# Patient Record
Sex: MALE | Race: WHITE | Employment: FULL TIME | URBAN - METROPOLITAN AREA
[De-identification: names, ages, dates, MRNs, and addresses within clinical notes are randomized per-mention and may not be internally consistent; named-entity substitution may affect disease eponyms.]

---

## 2020-08-08 ENCOUNTER — HOSPITAL ENCOUNTER (EMERGENCY)
Age: 35
Discharge: HOME | End: 2020-08-08
Attending: EMERGENCY MEDICINE
Payer: COMMERCIAL

## 2020-08-08 VITALS
OXYGEN SATURATION: 96 % | RESPIRATION RATE: 18 BRPM | DIASTOLIC BLOOD PRESSURE: 112 MMHG | HEART RATE: 91 BPM | SYSTOLIC BLOOD PRESSURE: 158 MMHG | HEIGHT: 71 IN | WEIGHT: 315 LBS | TEMPERATURE: 98 F | BODY MASS INDEX: 44.1 KG/M2

## 2020-08-08 DIAGNOSIS — I10 ESSENTIAL HYPERTENSION: ICD-10-CM

## 2020-08-08 DIAGNOSIS — M54.16 LUMBAR RADICULOPATHY: Primary | ICD-10-CM

## 2020-08-08 PROCEDURE — 99283 EMERGENCY DEPT VISIT LOW MDM: CPT

## 2020-08-08 RX ORDER — IBUPROFEN 600 MG/1
600 TABLET ORAL ONCE
Status: COMPLETED | OUTPATIENT
Start: 2020-08-08 | End: 2020-08-08

## 2020-08-08 RX ORDER — LISINOPRIL AND HYDROCHLOROTHIAZIDE 20; 12.5 MG/1; MG/1
1 TABLET ORAL DAILY
Qty: 30 TABLET | Refills: 0 | Status: SHIPPED | OUTPATIENT
Start: 2020-08-08 | End: 2020-09-07

## 2020-08-08 RX ORDER — LISINOPRIL 20 MG/1
20 TABLET ORAL DAILY
COMMUNITY

## 2020-08-08 RX ORDER — PREDNISONE 20 MG/1
40 TABLET ORAL DAILY
Qty: 10 TABLET | Refills: 0 | Status: SHIPPED | OUTPATIENT
Start: 2020-08-08 | End: 2020-08-13

## 2020-08-08 RX ORDER — HYDROCHLOROTHIAZIDE 12.5 MG/1
12.5 CAPSULE, GELATIN COATED ORAL DAILY
COMMUNITY

## 2020-08-08 RX ORDER — CLONIDINE HYDROCHLORIDE 0.1 MG/1
0.1 TABLET ORAL ONCE
Status: COMPLETED | OUTPATIENT
Start: 2020-08-08 | End: 2020-08-08

## 2020-08-08 RX ORDER — PREDNISONE 20 MG/1
60 TABLET ORAL ONCE
Status: COMPLETED | OUTPATIENT
Start: 2020-08-08 | End: 2020-08-08

## 2020-08-08 NOTE — ED PROVIDER NOTES
Patient Seen in: THE [facility] Emergency Department In [place]      History   Patient presents with:  Back Pain    Stated Complaint: LOW BACK PAIN    HPI    This is a 30 old male with past medical history of hypertension noncompliant, obesity who presents wi paraspinal muscles. EXTREMITIES: Warm with brisk capillary refill. Neuro: +2/4 bilateral patellar and Achilles reflexes. Sensation equal intact. Motor strength 5/5 in lower extremities.     ED Course   Labs Reviewed - No data to display               M

## 2020-08-08 NOTE — ED INITIAL ASSESSMENT (HPI)
p tto ed from home with c/o low back pain, pt sx present for a few days, denies injury or trauma, pt having some numbness to l leg.

## 2022-12-27 ENCOUNTER — TELEPHONE (OUTPATIENT)
Dept: FAMILY MEDICINE CLINIC | Facility: CLINIC | Age: 37
End: 2022-12-27

## 2022-12-27 ENCOUNTER — OFFICE VISIT (OUTPATIENT)
Dept: FAMILY MEDICINE CLINIC | Facility: CLINIC | Age: 37
End: 2022-12-27
Payer: COMMERCIAL

## 2022-12-27 VITALS
BODY MASS INDEX: 46.13 KG/M2 | WEIGHT: 315 LBS | TEMPERATURE: 98 F | RESPIRATION RATE: 18 BRPM | HEART RATE: 90 BPM | DIASTOLIC BLOOD PRESSURE: 100 MMHG | HEIGHT: 69.25 IN | SYSTOLIC BLOOD PRESSURE: 160 MMHG

## 2022-12-27 DIAGNOSIS — I10 ESSENTIAL HYPERTENSION: ICD-10-CM

## 2022-12-27 DIAGNOSIS — Z00.00 ANNUAL PHYSICAL EXAM: Primary | ICD-10-CM

## 2022-12-27 DIAGNOSIS — Z13.6 SCREENING FOR CARDIOVASCULAR CONDITION: ICD-10-CM

## 2022-12-27 PROCEDURE — 99385 PREV VISIT NEW AGE 18-39: CPT | Performed by: FAMILY MEDICINE

## 2022-12-27 PROCEDURE — 3077F SYST BP >= 140 MM HG: CPT | Performed by: FAMILY MEDICINE

## 2022-12-27 PROCEDURE — 3080F DIAST BP >= 90 MM HG: CPT | Performed by: FAMILY MEDICINE

## 2022-12-27 PROCEDURE — 3008F BODY MASS INDEX DOCD: CPT | Performed by: FAMILY MEDICINE

## 2022-12-27 RX ORDER — LISINOPRIL 20 MG/1
20 TABLET ORAL DAILY
Qty: 90 TABLET | Refills: 0 | Status: SHIPPED | OUTPATIENT
Start: 2022-12-27 | End: 2023-03-27

## 2022-12-27 RX ORDER — LISINOPRIL 20 MG/1
20 TABLET ORAL DAILY
Qty: 90 TABLET | Refills: 0 | Status: SHIPPED | OUTPATIENT
Start: 2022-12-27 | End: 2022-12-27

## 2022-12-27 RX ORDER — OMEPRAZOLE 20 MG/1
20 CAPSULE, DELAYED RELEASE ORAL
COMMUNITY

## 2022-12-27 NOTE — TELEPHONE ENCOUNTER
[facility] pharmacy called said ot is requesting this medication lisinopril 20 MG Oral Tab   To be sent to them   [facility] [address] [address]  [phone], [phone]

## 2022-12-28 ENCOUNTER — TELEPHONE (OUTPATIENT)
Dept: FAMILY MEDICINE CLINIC | Facility: CLINIC | Age: 37
End: 2022-12-28

## 2022-12-28 DIAGNOSIS — R73.9 HYPERGLYCEMIA: Primary | ICD-10-CM

## 2022-12-28 NOTE — TELEPHONE ENCOUNTER
Can you please call [facility] and add hba1c Stop Victoza.  Stop Lisinopril-Hydrochlorothiazide.  Rest until your dizziness passes. This may take a couple of days.  Don't check blood sugars until Saturday.  Then, call if all your blood sugars are more than 200 during the day.   See me back in a week, if Dr. Silva is not available.

## 2022-12-28 NOTE — TELEPHONE ENCOUNTER
hba1c still pending. Otherwise thyroid blood count normal.  His cholesterol and lft elevated he needs to lose weight low fat diet and exercise and we will repeat lft in 3months.  Blood sugar elevated

## 2022-12-29 LAB
ALBUMIN/GLOBULIN RATIO: 2 (CALC) (ref 1–2.5)
ALBUMIN: 4.3 G/DL (ref 3.6–5.1)
ALKALINE PHOSPHATASE: 67 U/L (ref 36–130)
ALT: 106 U/L (ref 9–46)
AST: 57 U/L (ref 10–40)
BILIRUBIN, TOTAL: 0.9 MG/DL (ref 0.2–1.2)
BUN: 16 MG/DL (ref 7–25)
CALCIUM: 9.2 MG/DL (ref 8.6–10.3)
CARBON DIOXIDE: 25 MMOL/L (ref 20–32)
CHLORIDE: 100 MMOL/L (ref 98–110)
CHOL/HDLC RATIO: 5.5 (CALC)
CHOLESTEROL, TOTAL: 203 MG/DL
CREATININE: 0.84 MG/DL (ref 0.6–1.26)
EGFR: 116 ML/MIN/1.73M2
GLOBULIN: 2.1 G/DL (CALC) (ref 1.9–3.7)
GLUCOSE: 346 MG/DL (ref 65–99)
HDL CHOLESTEROL: 37 MG/DL
HEMATOCRIT: 47.3 % (ref 38.5–50)
HEMOGLOBIN A1C: 11.4 % OF TOTAL HGB
HEMOGLOBIN: 15.8 G/DL (ref 13.2–17.1)
LDL-CHOLESTEROL: 129 MG/DL (CALC)
MCH: 30.9 PG (ref 27–33)
MCHC: 33.4 G/DL (ref 32–36)
MCV: 92.4 FL (ref 80–100)
MPV: 13.3 FL (ref 7.5–12.5)
NON-HDL CHOLESTEROL: 166 MG/DL (CALC)
PLATELET COUNT: 161 THOUSAND/UL (ref 140–400)
POTASSIUM: 4.1 MMOL/L (ref 3.5–5.3)
PROTEIN, TOTAL: 6.4 G/DL (ref 6.1–8.1)
RDW: 11.9 % (ref 11–15)
RED BLOOD CELL COUNT: 5.12 MILLION/UL (ref 4.2–5.8)
SODIUM: 133 MMOL/L (ref 135–146)
TRIGLYCERIDES: 232 MG/DL
TSH W/REFLEX TO FT4: 1.16 MIU/L (ref 0.4–4.5)
WHITE BLOOD CELL COUNT: 5.8 THOUSAND/UL (ref 3.8–10.8)

## 2022-12-29 NOTE — TELEPHONE ENCOUNTER
Patient advised. Verbalized understanding.    Future Appointments   Date Time Provider Aby Deonna   1/7/2023 11:00 AM Luis Fernando Bunn MD [facility]   1/26/2023  8:00 AM Luis Fernando Bunn MD [facility]

## 2023-01-07 ENCOUNTER — OFFICE VISIT (OUTPATIENT)
Dept: FAMILY MEDICINE CLINIC | Facility: CLINIC | Age: 38
End: 2023-01-07
Payer: COMMERCIAL

## 2023-01-07 VITALS
RESPIRATION RATE: 18 BRPM | SYSTOLIC BLOOD PRESSURE: 160 MMHG | TEMPERATURE: 97 F | HEIGHT: 69 IN | DIASTOLIC BLOOD PRESSURE: 120 MMHG | BODY MASS INDEX: 46.51 KG/M2 | HEART RATE: 71 BPM | WEIGHT: 314 LBS | OXYGEN SATURATION: 98 %

## 2023-01-07 DIAGNOSIS — E11.9 TYPE 2 DIABETES MELLITUS WITHOUT COMPLICATION, WITHOUT LONG-TERM CURRENT USE OF INSULIN (HCC): ICD-10-CM

## 2023-01-07 DIAGNOSIS — I10 ESSENTIAL HYPERTENSION: Primary | ICD-10-CM

## 2023-01-07 PROCEDURE — 3008F BODY MASS INDEX DOCD: CPT | Performed by: FAMILY MEDICINE

## 2023-01-07 PROCEDURE — 3080F DIAST BP >= 90 MM HG: CPT | Performed by: FAMILY MEDICINE

## 2023-01-07 PROCEDURE — 3077F SYST BP >= 140 MM HG: CPT | Performed by: FAMILY MEDICINE

## 2023-01-07 PROCEDURE — 99214 OFFICE O/P EST MOD 30 MIN: CPT | Performed by: FAMILY MEDICINE

## 2023-01-07 RX ORDER — HYDROCHLOROTHIAZIDE 25 MG/1
25 TABLET ORAL DAILY
Qty: 90 TABLET | Refills: 0 | Status: SHIPPED | OUTPATIENT
Start: 2023-01-07 | End: 2023-04-07

## 2023-01-07 RX ORDER — LANCETS 28 GAUGE
1 EACH MISCELLANEOUS 3 TIMES DAILY
Qty: 100 EACH | Refills: 1 | Status: SHIPPED | OUTPATIENT
Start: 2023-01-07 | End: 2023-01-07

## 2023-01-07 RX ORDER — LANCETS
EACH MISCELLANEOUS
Qty: 300 EACH | Refills: 0 | Status: SHIPPED | OUTPATIENT
Start: 2023-01-07

## 2023-01-07 RX ORDER — BLOOD-GLUCOSE METER
KIT MISCELLANEOUS
Qty: 100 STRIP | Refills: 1 | Status: SHIPPED | OUTPATIENT
Start: 2023-01-07 | End: 2023-01-07

## 2023-01-07 RX ORDER — METFORMIN HYDROCHLORIDE 750 MG/1
750 TABLET, EXTENDED RELEASE ORAL 2 TIMES DAILY WITH MEALS
Qty: 180 TABLET | Refills: 0 | Status: SHIPPED | OUTPATIENT
Start: 2023-01-07 | End: 2023-04-07

## 2023-01-07 RX ORDER — BLOOD-GLUCOSE METER
1 KIT MISCELLANEOUS AS NEEDED
Qty: 1 EACH | Refills: 0 | Status: SHIPPED | OUTPATIENT
Start: 2023-01-07 | End: 2023-01-07

## 2023-01-07 RX ORDER — BLOOD-GLUCOSE METER
1 EACH MISCELLANEOUS DAILY
Qty: 1 KIT | Refills: 0 | Status: SHIPPED | OUTPATIENT
Start: 2023-01-07

## 2023-01-07 RX ORDER — BLOOD SUGAR DIAGNOSTIC
1 STRIP MISCELLANEOUS 3 TIMES DAILY
Qty: 300 STRIP | Refills: 0 | Status: SHIPPED | OUTPATIENT
Start: 2023-01-07

## 2023-02-06 ENCOUNTER — TELEPHONE (OUTPATIENT)
Dept: FAMILY MEDICINE CLINIC | Facility: CLINIC | Age: 38
End: 2023-02-06

## 2023-02-21 DIAGNOSIS — I10 ESSENTIAL HYPERTENSION: ICD-10-CM

## 2023-02-21 RX ORDER — LISINOPRIL 20 MG/1
20 TABLET ORAL DAILY
Qty: 90 TABLET | Refills: 0 | Status: SHIPPED | OUTPATIENT
Start: 2023-02-21 | End: 2023-05-22

## 2023-02-21 NOTE — TELEPHONE ENCOUNTER
Last refilled on 12/27/22 for # 90 with 0 refills  Last OV 1/7/23  No future appointments. Thank you.

## 2023-02-23 NOTE — TELEPHONE ENCOUNTER
received fax from [facility], patient does not have an account with them - medication sent to this mail order pharmacy per patient request  Called patient to let him know he should set up a profile with [facility]

## 2023-03-02 DIAGNOSIS — E11.9 TYPE 2 DIABETES MELLITUS WITHOUT COMPLICATION, WITHOUT LONG-TERM CURRENT USE OF INSULIN (HCC): ICD-10-CM

## 2023-03-02 DIAGNOSIS — I10 ESSENTIAL HYPERTENSION: ICD-10-CM

## 2023-03-02 RX ORDER — HYDROCHLOROTHIAZIDE 25 MG/1
25 TABLET ORAL DAILY
Qty: 30 TABLET | Refills: 2 | Status: SHIPPED | OUTPATIENT
Start: 2023-03-02 | End: 2023-05-31

## 2023-03-02 RX ORDER — METFORMIN HYDROCHLORIDE 750 MG/1
TABLET, EXTENDED RELEASE ORAL
Qty: 60 TABLET | Refills: 2 | Status: SHIPPED | OUTPATIENT
Start: 2023-03-02

## 2023-03-06 ENCOUNTER — TELEPHONE (OUTPATIENT)
Dept: FAMILY MEDICINE CLINIC | Facility: CLINIC | Age: 38
End: 2023-03-06

## 2023-03-09 ENCOUNTER — TELEPHONE (OUTPATIENT)
Dept: FAMILY MEDICINE CLINIC | Facility: CLINIC | Age: 38
End: 2023-03-09

## 2023-03-09 DIAGNOSIS — I10 ESSENTIAL HYPERTENSION: ICD-10-CM

## 2023-03-09 DIAGNOSIS — E11.9 TYPE 2 DIABETES MELLITUS WITHOUT COMPLICATION, WITHOUT LONG-TERM CURRENT USE OF INSULIN (HCC): ICD-10-CM

## 2023-03-09 RX ORDER — METFORMIN HYDROCHLORIDE 750 MG/1
750 TABLET, EXTENDED RELEASE ORAL 2 TIMES DAILY WITH MEALS
Qty: 180 TABLET | Refills: 0 | Status: SHIPPED | OUTPATIENT
Start: 2023-03-09

## 2023-03-09 RX ORDER — HYDROCHLOROTHIAZIDE 25 MG/1
25 TABLET ORAL DAILY
Qty: 90 TABLET | Refills: 0 | Status: SHIPPED | OUTPATIENT
Start: 2023-03-09 | End: 2023-06-07

## 2023-03-09 RX ORDER — LISINOPRIL 20 MG/1
20 TABLET ORAL DAILY
Qty: 90 TABLET | Refills: 0 | Status: SHIPPED | OUTPATIENT
Start: 2023-03-09 | End: 2023-06-07

## 2023-03-09 NOTE — TELEPHONE ENCOUNTER
RX were sent on 3/2 to pt's local pharmacy,  Pt insurance requires all rx be sent to [facility]    Resend RX:     METFORMIN  MG Oral Tablet 24 Hr  HYDROCHLOROTHIAZIDE 25 MG Oral Tab  lisinopril 20 MG Oral Tab

## 2023-03-10 PROCEDURE — 3051F HG A1C>EQUAL 7.0%<8.0%: CPT | Performed by: FAMILY MEDICINE

## 2023-03-11 LAB
ALBUMIN/GLOBULIN RATIO: 1.7 (CALC) (ref 1–2.5)
ALBUMIN: 4.5 G/DL (ref 3.6–5.1)
ALKALINE PHOSPHATASE: 57 U/L (ref 36–130)
ALT: 76 U/L (ref 9–46)
AST: 69 U/L (ref 10–40)
BILIRUBIN, TOTAL: 1.1 MG/DL (ref 0.2–1.2)
BUN: 17 MG/DL (ref 7–25)
CALCIUM: 9.8 MG/DL (ref 8.6–10.3)
CARBON DIOXIDE: 30 MMOL/L (ref 20–32)
CHLORIDE: 100 MMOL/L (ref 98–110)
CREATININE: 0.84 MG/DL (ref 0.6–1.26)
EGFR: 115 ML/MIN/1.73M2
GLOBULIN: 2.7 G/DL (CALC) (ref 1.9–3.7)
GLUCOSE: 112 MG/DL (ref 65–99)
HEMOGLOBIN A1C: 7 % OF TOTAL HGB
POTASSIUM: 4.4 MMOL/L (ref 3.5–5.3)
PROTEIN, TOTAL: 7.2 G/DL (ref 6.1–8.1)
SODIUM: 138 MMOL/L (ref 135–146)

## 2023-03-19 DIAGNOSIS — I10 ESSENTIAL HYPERTENSION: ICD-10-CM

## 2023-03-19 DIAGNOSIS — E11.9 TYPE 2 DIABETES MELLITUS WITHOUT COMPLICATION, WITHOUT LONG-TERM CURRENT USE OF INSULIN (HCC): ICD-10-CM

## 2023-03-20 RX ORDER — METFORMIN HYDROCHLORIDE 750 MG/1
TABLET, EXTENDED RELEASE ORAL
Qty: 180 TABLET | Refills: 0 | OUTPATIENT
Start: 2023-03-20

## 2023-03-20 RX ORDER — HYDROCHLOROTHIAZIDE 25 MG/1
TABLET ORAL
Qty: 90 TABLET | Refills: 0 | OUTPATIENT
Start: 2023-03-20

## 2023-03-20 RX ORDER — LISINOPRIL 20 MG/1
TABLET ORAL
Qty: 90 TABLET | Refills: 0 | OUTPATIENT
Start: 2023-03-20

## 2023-04-04 RX ORDER — BLOOD SUGAR DIAGNOSTIC
STRIP MISCELLANEOUS
Qty: 100 STRIP | Refills: 2 | Status: SHIPPED | OUTPATIENT
Start: 2023-04-04

## 2023-05-19 DIAGNOSIS — I10 ESSENTIAL HYPERTENSION: ICD-10-CM

## 2023-05-19 DIAGNOSIS — E11.9 TYPE 2 DIABETES MELLITUS WITHOUT COMPLICATION, WITHOUT LONG-TERM CURRENT USE OF INSULIN (HCC): ICD-10-CM

## 2023-05-19 RX ORDER — LISINOPRIL 20 MG/1
TABLET ORAL
Qty: 90 TABLET | Refills: 0 | Status: SHIPPED | OUTPATIENT
Start: 2023-05-19

## 2023-05-19 RX ORDER — METFORMIN HYDROCHLORIDE 750 MG/1
TABLET, EXTENDED RELEASE ORAL
Qty: 180 TABLET | Refills: 0 | Status: SHIPPED | OUTPATIENT
Start: 2023-05-19

## 2023-05-19 RX ORDER — HYDROCHLOROTHIAZIDE 25 MG/1
TABLET ORAL
Qty: 90 TABLET | Refills: 0 | Status: SHIPPED | OUTPATIENT
Start: 2023-05-19

## 2023-07-07 ENCOUNTER — TELEPHONE (OUTPATIENT)
Dept: FAMILY MEDICINE CLINIC | Facility: CLINIC | Age: 38
End: 2023-07-07

## 2023-07-07 DIAGNOSIS — E11.9 TYPE 2 DIABETES MELLITUS WITHOUT COMPLICATION, WITHOUT LONG-TERM CURRENT USE OF INSULIN (HCC): ICD-10-CM

## 2023-07-10 RX ORDER — METFORMIN HYDROCHLORIDE 750 MG/1
750 TABLET, EXTENDED RELEASE ORAL 2 TIMES DAILY WITH MEALS
Qty: 180 TABLET | Refills: 0 | Status: SHIPPED | OUTPATIENT
Start: 2023-07-10

## 2023-08-01 DIAGNOSIS — I10 ESSENTIAL HYPERTENSION: ICD-10-CM

## 2023-08-02 NOTE — TELEPHONE ENCOUNTER
Hypertension Medications Protocol [id number]/01/2023 09:04 PM   Protocol Details Appointment in past 6 or next 3 months    CMP or BMP in past 12 months    Last serum creatinine< 2.0   Due for px  No future appointments.    [facility] sent

## 2023-08-09 RX ORDER — HYDROCHLOROTHIAZIDE 25 MG/1
TABLET ORAL
Qty: 90 TABLET | Refills: 3 | OUTPATIENT
Start: 2023-08-09

## 2023-08-09 RX ORDER — LISINOPRIL 20 MG/1
TABLET ORAL
Qty: 90 TABLET | Refills: 3 | OUTPATIENT
Start: 2023-08-09

## 2023-09-02 ENCOUNTER — TELEPHONE (OUTPATIENT)
Dept: FAMILY MEDICINE CLINIC | Facility: CLINIC | Age: 38
End: 2023-09-02

## 2023-09-02 DIAGNOSIS — I10 ESSENTIAL HYPERTENSION: ICD-10-CM

## 2023-09-05 RX ORDER — LISINOPRIL 20 MG/1
20 TABLET ORAL DAILY
Qty: 90 TABLET | Refills: 0 | Status: SHIPPED | OUTPATIENT
Start: 2023-09-05

## 2023-09-05 NOTE — TELEPHONE ENCOUNTER
Pt called back did schedule apt said can't come in till October.  Per pt can he get a refill on his LISINOPRIL 20 MG Oral.     [facility] DELIVERY ([address] [place] [address]) Noman Vera, [address] [place] [address]     Future Appointments   Date Time Provider Aby Ying   10/5/2023  2:00 PM Jesse Hayes MD [facility]

## 2023-09-28 ENCOUNTER — TELEPHONE (OUTPATIENT)
Dept: FAMILY MEDICINE CLINIC | Facility: CLINIC | Age: 38
End: 2023-09-28

## 2023-09-28 NOTE — TELEPHONE ENCOUNTER
Pt wife called, states pt is in [place] camping and he was bit by something on his shin,  states area he was bit is very sore and feels like its moving to back of his ankle. Wanted to know if he should wait until he comes home to be seen. Informed wife to go to IC and can f/u with Dr or APN. She VU and will let pt know.

## 2023-10-25 DIAGNOSIS — E11.9 TYPE 2 DIABETES MELLITUS WITHOUT COMPLICATION, WITHOUT LONG-TERM CURRENT USE OF INSULIN (HCC): ICD-10-CM

## 2023-10-26 NOTE — TELEPHONE ENCOUNTER
Diabetic Medication Protocol [id number]/25/2023 10:12 PM   Protocol Details HgBA1C procedure resulted in past 6 months    Last HgBA1C < 7.5    Microalbumin procedure in past 12 months or taking ACE/ARB    Appointment in past 6 or next 3 months   Repeat A1c due  Future Appointments   Date Time Provider Aby Ying   11/14/2023 10:00 AM Morris Perry MD [facility]

## 2023-10-31 DIAGNOSIS — I10 ESSENTIAL HYPERTENSION: ICD-10-CM

## 2023-10-31 RX ORDER — LISINOPRIL 20 MG/1
20 TABLET ORAL DAILY
Qty: 90 TABLET | Refills: 3 | Status: SHIPPED | OUTPATIENT
Start: 2023-10-31

## 2023-11-02 RX ORDER — METFORMIN HYDROCHLORIDE 750 MG/1
750 TABLET, EXTENDED RELEASE ORAL 2 TIMES DAILY WITH MEALS
Qty: 180 TABLET | Refills: 3 | OUTPATIENT
Start: 2023-11-02

## 2023-11-14 ENCOUNTER — OFFICE VISIT (OUTPATIENT)
Dept: FAMILY MEDICINE CLINIC | Facility: CLINIC | Age: 38
End: 2023-11-14
Payer: COMMERCIAL

## 2023-11-14 VITALS
HEIGHT: 69 IN | RESPIRATION RATE: 18 BRPM | WEIGHT: 315 LBS | TEMPERATURE: 98 F | HEART RATE: 95 BPM | DIASTOLIC BLOOD PRESSURE: 100 MMHG | SYSTOLIC BLOOD PRESSURE: 150 MMHG | BODY MASS INDEX: 46.65 KG/M2

## 2023-11-14 DIAGNOSIS — Z80.0 FAMILY HISTORY OF COLON CANCER: ICD-10-CM

## 2023-11-14 DIAGNOSIS — I10 ESSENTIAL HYPERTENSION: ICD-10-CM

## 2023-11-14 DIAGNOSIS — Z12.11 COLON CANCER SCREENING: ICD-10-CM

## 2023-11-14 DIAGNOSIS — E11.9 TYPE 2 DIABETES MELLITUS WITHOUT COMPLICATION, WITHOUT LONG-TERM CURRENT USE OF INSULIN (HCC): Primary | ICD-10-CM

## 2023-11-14 PROCEDURE — 3008F BODY MASS INDEX DOCD: CPT | Performed by: FAMILY MEDICINE

## 2023-11-14 PROCEDURE — 3077F SYST BP >= 140 MM HG: CPT | Performed by: FAMILY MEDICINE

## 2023-11-14 PROCEDURE — 99214 OFFICE O/P EST MOD 30 MIN: CPT | Performed by: FAMILY MEDICINE

## 2023-11-14 PROCEDURE — 3080F DIAST BP >= 90 MM HG: CPT | Performed by: FAMILY MEDICINE

## 2023-11-14 RX ORDER — AMLODIPINE BESYLATE 2.5 MG/1
2.5 TABLET ORAL 2 TIMES DAILY
COMMUNITY
Start: 2023-10-30

## 2023-11-14 RX ORDER — LISINOPRIL 20 MG/1
20 TABLET ORAL 2 TIMES DAILY
Qty: 60 TABLET | Refills: 0 | Status: SHIPPED | OUTPATIENT
Start: 2023-11-14

## 2023-11-14 RX ORDER — HYDROCHLOROTHIAZIDE 25 MG/1
25 TABLET ORAL DAILY
Qty: 90 TABLET | Refills: 3 | Status: SHIPPED | OUTPATIENT
Start: 2023-11-14

## 2023-11-14 RX ORDER — HYDROCHLOROTHIAZIDE 25 MG/1
25 TABLET ORAL DAILY
Qty: 30 TABLET | Refills: 0 | Status: SHIPPED | OUTPATIENT
Start: 2023-11-14

## 2023-11-25 ENCOUNTER — NURSE ONLY (OUTPATIENT)
Dept: FAMILY MEDICINE CLINIC | Facility: CLINIC | Age: 38
End: 2023-11-25
Payer: COMMERCIAL

## 2023-11-25 ENCOUNTER — TELEPHONE (OUTPATIENT)
Dept: FAMILY MEDICINE CLINIC | Facility: CLINIC | Age: 38
End: 2023-11-25

## 2023-11-25 VITALS — SYSTOLIC BLOOD PRESSURE: 126 MMHG | DIASTOLIC BLOOD PRESSURE: 82 MMHG

## 2023-11-25 NOTE — TELEPHONE ENCOUNTER
Patient was here for a nurse visit for blood pressure check . Patient sit down for 5 mins before checking his blood pressure. Patients blood pressure was 126/82 when he was here in the office. Please Advise.

## 2023-12-14 ENCOUNTER — TELEPHONE (OUTPATIENT)
Dept: FAMILY MEDICINE CLINIC | Facility: CLINIC | Age: 38
End: 2023-12-14

## 2023-12-14 NOTE — TELEPHONE ENCOUNTER
Overdue labs  [facility] sent  Future Appointments   Date Time Provider Aby Ying   2/12/2024  7:30 AM CURTIS, PROCEDURE [facility] None

## 2024-01-19 ENCOUNTER — TELEPHONE (OUTPATIENT)
Dept: FAMILY MEDICINE CLINIC | Facility: CLINIC | Age: 39
End: 2024-01-19

## 2024-01-19 NOTE — TELEPHONE ENCOUNTER
Overdue labs  [facility] sent  Future Appointments   Date Time Provider Department Center   2/12/2024  7:30 AM CURTIS, PROCEDURE [facility] None

## 2024-02-12 ENCOUNTER — HOSPITAL ENCOUNTER (OUTPATIENT)
Facility: HOSPITAL | Age: 39
Setting detail: HOSPITAL OUTPATIENT SURGERY
Discharge: HOME | End: 2024-02-12
Attending: STUDENT IN AN ORGANIZED HEALTH CARE EDUCATION/TRAINING PROGRAM | Admitting: STUDENT IN AN ORGANIZED HEALTH CARE EDUCATION/TRAINING PROGRAM
Payer: COMMERCIAL

## 2024-02-12 ENCOUNTER — ANESTHESIA (OUTPATIENT)
Dept: ENDOSCOPY | Facility: HOSPITAL | Age: 39
End: 2024-02-12
Payer: COMMERCIAL

## 2024-02-12 ENCOUNTER — ANESTHESIA EVENT (OUTPATIENT)
Dept: ENDOSCOPY | Facility: HOSPITAL | Age: 39
End: 2024-02-12
Payer: COMMERCIAL

## 2024-02-12 VITALS
HEART RATE: 72 BPM | TEMPERATURE: 99 F | DIASTOLIC BLOOD PRESSURE: 77 MMHG | WEIGHT: 315 LBS | BODY MASS INDEX: 46.65 KG/M2 | RESPIRATION RATE: 16 BRPM | HEIGHT: 69 IN | OXYGEN SATURATION: 96 % | SYSTOLIC BLOOD PRESSURE: 92 MMHG

## 2024-02-12 DIAGNOSIS — Z12.11 COLON CANCER SCREENING: ICD-10-CM

## 2024-02-12 LAB
GLUCOSE BLD-MCNC: 226 MG/DL (ref 70–99)
GLUCOSE BLD-MCNC: 258 MG/DL (ref 70–99)

## 2024-02-12 PROCEDURE — 88305 TISSUE EXAM BY PATHOLOGIST: CPT | Performed by: STUDENT IN AN ORGANIZED HEALTH CARE EDUCATION/TRAINING PROGRAM

## 2024-02-12 PROCEDURE — 82962 GLUCOSE BLOOD TEST: CPT

## 2024-02-12 PROCEDURE — 0DBK8ZX EXCISION OF ASCENDING COLON, VIA NATURAL OR ARTIFICIAL OPENING ENDOSCOPIC, DIAGNOSTIC: ICD-10-PCS | Performed by: STUDENT IN AN ORGANIZED HEALTH CARE EDUCATION/TRAINING PROGRAM

## 2024-02-12 RX ORDER — NICOTINE POLACRILEX 4 MG
30 LOZENGE BUCCAL
Status: DISCONTINUED | OUTPATIENT
Start: 2024-02-12 | End: 2024-02-12

## 2024-02-12 RX ORDER — NICOTINE POLACRILEX 4 MG
15 LOZENGE BUCCAL
Status: DISCONTINUED | OUTPATIENT
Start: 2024-02-12 | End: 2024-02-12

## 2024-02-12 RX ORDER — ONDANSETRON 2 MG/ML
4 INJECTION INTRAMUSCULAR; INTRAVENOUS ONCE AS NEEDED
Status: DISCONTINUED | OUTPATIENT
Start: 2024-02-12 | End: 2024-02-12

## 2024-02-12 RX ORDER — SODIUM CHLORIDE, SODIUM LACTATE, POTASSIUM CHLORIDE, CALCIUM CHLORIDE 600; 310; 30; 20 MG/100ML; MG/100ML; MG/100ML; MG/100ML
INJECTION, SOLUTION INTRAVENOUS CONTINUOUS
Status: DISCONTINUED | OUTPATIENT
Start: 2024-02-12 | End: 2024-02-12

## 2024-02-12 RX ORDER — DEXTROSE MONOHYDRATE 25 G/50ML
50 INJECTION, SOLUTION INTRAVENOUS
Status: DISCONTINUED | OUTPATIENT
Start: 2024-02-12 | End: 2024-02-12

## 2024-02-12 RX ORDER — NALOXONE HYDROCHLORIDE 0.4 MG/ML
0.08 INJECTION, SOLUTION INTRAMUSCULAR; INTRAVENOUS; SUBCUTANEOUS ONCE AS NEEDED
Status: DISCONTINUED | OUTPATIENT
Start: 2024-02-12 | End: 2024-02-12

## 2024-02-12 RX ADMIN — SODIUM CHLORIDE, SODIUM LACTATE, POTASSIUM CHLORIDE, CALCIUM CHLORIDE: 600; 310; 30; 20 INJECTION, SOLUTION INTRAVENOUS at 09:16:00

## 2024-02-12 NOTE — H&P
[facility] History and Physical Procedure Note    CC: colonoscopy    History of Present Illness: Jak Esquivel is a 39 year old male who presents for a  colonoscopy.    Indication:   Colon cancer screening   + First Degree Relative with a history of Colorectal Cancer - sister dx with colon cancer in 30s        No overt Blood in stool   No Constipation   No Diarrhea   No Abdominal pain     No Reflux symptoms   No Dysphagia       Medications reviewed as below:   No non-aspirin antithrombotic agents    Medications:  No current outpatient medications on file.    Past Medical History:  Past Medical History:   Diagnosis Date    Diabetes (HCC)     High blood pressure     HTN (hypertension)     LVH (left ventricular hypertrophy)     Sleep apnea     no device yet at time of screen 1/24    Visual impairment     glasses       Past Surgical History:  Past Surgical History:   Procedure Laterality Date    APPENDECTOMY  2000       Family History:  History reviewed. No pertinent family history.    Social History:  Social History     Socioeconomic History    Marital status: Single   Tobacco Use    Smoking status: Never    Smokeless tobacco: Never   Vaping Use    Vaping Use: Never used   Substance and Sexual Activity    Alcohol use: Yes     Comment: social    Drug use: Never       Review of Systems  Negative unless otherwise mentioned in HPI.     Allergies:  No Known Allergies      Objective:    Physical Exam  /89 (BP Location: Left arm)   Pulse 88   Temp 98.9 °F (37.2 °C)   Resp 16   Ht 5' 9\" (1.753 m)   Wt (!) 339 lb (153.8 kg)   SpO2 98%   BMI 50.06 kg/m²   Body mass index is 50.06 kg/m².    Gen: Awake and alert, NAD  CV: Ext warm b/l  Resp: no resp distress  Neuro: NAD, Aox3.     Pertinent labs:   Lab Results   Component Value Date     03/10/2023    K 4.4 03/10/2023     03/10/2023    CO2 30 03/10/2023    BUN 17 03/10/2023     Lab Results   Component Value Date    WBC 5.8 12/27/2022     HGB 15.8 12/27/2022    HCT 47.3 12/27/2022    MCV 92.4 12/27/2022     12/27/2022     Lab Results   Component Value Date    AST 69 (H) 03/10/2023    ALT 76 (H) 03/10/2023    ALB 4.5 03/10/2023     No results found for: \"INR\"    Imaging:  No image results found.       Informed consent  Informed Consent:   The planned procedure(s), the explanation of the procedure, indications, its expected benefits, the potential complications and risks and possible alternatives and their benefits and risks were discussed with the patient. The discussion of risks, not limited to but including bleeding, infection, perforation / tear in the gastrointestinal tract, adverse effects from anesthesia, and possible prolonged hospitalization, emergency surgery, risk of morbidity or mortality, and risk of missed lesion(s) were discussed with patient. Pt understands the missed rate of colonoscopy of polyps, lesions of 5-10% even in the best of circumstances and that although this is an accurate test, there are limitations to colonoscopy.     Patient understood the proposed procedure(s), and informed consented to the procedure and elected to proceed with the procedure(s) with possible intervention (such as polypectomy, biopsy, control of bleeding, etc.) and its risks, benefits and alternatives and wish to proceed with procedure(s). All questions answered in full to patient's  satisfaction in full.       Impression/Recommendations:  Jak Esquivel is a 39 year old male who presents for a colonoscopy +/- endotherapy.   Plan to proceed with colonoscopy with anesthesia.     ASA class per anesthesia.   Informed consent obtained as detailed above.       MICHELLE ACEVEDO M.D.  [facility]

## 2024-02-12 NOTE — OPERATIVE REPORT
Colonoscopy Operative Report  Patient Name: Jak Esquivel  YOB: 1985  MRN: [id number]  Procedure: Colonoscopy with polypectomy  Pre-operative Diagnosis & Indication:   Colon cancer screening (high risk - patient's sister diagnosed with colon cancer)  Post-operative Diagnosis:   Colon polyp x 1 s/p polypectomy  Hemorrhoids   Attending Endoscopist: Ilya Villegas M.D.  Informed Consent: The planned procedure(s), the explanation of the procedure, its expected benefits, the potential complications and risks and possible alternatives and their benefits and risks were discussed with the patient or the patient's surrogate. The discussion of risks, not limited to but including bleeding, infection, perforation, adverse effects from anesthesia, need for emergency surgery, medication effects, cardiac arrhythmias, missed polyps, and aspiration and death, were discussed with patient.  Pt and/or surrogate understood the proposed procedure(s), its risks, benefits and alternatives and wish to proceed with procedure(s). All questions answered in full.  After all questions were answered to their satisfaction, a signed, informed, and witnessed consent was obtained.  Physical Exam: Heart: regular rate and rhythm. No rubs, murmurs, or gallops. Lungs: Clear to auscultation bilaterally. Abdomen: Soft, non-tender, non distended. Positive bowel sounds. No rebound tenderness, no guarding.   A TIME OUT WAS COMPLETED prior to the procedure to confirm the patient, procedure and complete endoscopy safety procedure.   Sedation: Monitored Anesthesia Care; ASA class per anesthesiology team   Monitoring: Pulsoximetry, pulse, respirations, and blood pressure, vitals were monitored throughout the entire procedure under monitored anesthesia care.   Preparation Quality:  [place] Bowel Prep Score: 7  [Right 2 / Transverse 2 / Left 3 ]   Procedure: After achieving adequate sedation, and placing the patient in the left lateral decubitus  position, a digital rectal examination was performed.  The lubricated tip of the  colonoscope was then introduced into the rectum and advanced to the terminal ileum.  The appendiceal orifice and ileocecal valve were clearly and distinctly visualized, thus verifying the cecum.   The terminal ileum was  intubated.  The endoscope was then carefully withdrawn from the patient with careful visualization of the colonic mucosa to the best of my ability, with bowel preparation quality as noted above.  Air was suctioned to the best of my ability, during withdrawal of the endoscope.  When the endoscope reached the rectum, it was placed in a retroflexed position, and the rectal bulb was thus visualized.  The endoscope was righted, and air was suctioned from the colon to the best of my ability, as it was during withdrawn from the colon.  The endoscope was then removed from the patient.  The patient tolerated the procedure without apparent procedural complications.  The patient left the procedure room in stable condition for recovery.  Toleration: Patient tolerated procedure well   Complications: No immediate complications   Technical Difficulty:  The procedure was not technically difficult   Estimated Blood Loss: Minimal, less than 5mL of estimated blood loss.   Withdrawal time: Withdrawal of endoscope was 16 minutes  Findings and Therapeutics:  Terminal Ileum:  The entire examined ileum was normal.   Colon:   One 5mm sessile polyp in the ascending colon. Complete polypectomy with cold snare. Polyp retrieved. Hemostasis.   Rectum:   There were small sized, internal and external hemorrhoids seen on retroflexion.   Recommendations:    Post Colonoscopy/polypectomy precautions, watch for bleeding, infection, perforation, adverse drug reactions.   Follow up pathology  Repeat colonoscopy in 5 years given colon polyp AND family history   Given above and family history, at age 39 - refer to genetic counseling     Ilya Villegas  MD  2/12/2024  9:13 AM

## 2024-02-12 NOTE — DISCHARGE INSTRUCTIONS
Home Care Instructions for Colonoscopy with Sedation    Diet:  - Resume your regular diet as tolerated unless otherwise instructed.  - Start with light meals to minimize bloating.  - Do not drink alcohol today.    Medication:  - If you have questions about resuming your normal medications, please contact your Primary Care Physician.    Activities:  - Take it easy today. Do not return to work today.  - Do not drive today.  - Do not operate any machinery today (including kitchen equipment).    Gastroscopy:  - You may have a sore throat for 2-3 days following the exam. This is normal. Gargling with warm salt water (1/2 tsp salt to 1 glass warm water) or using throat lozenges will help.  - If you experience any sharp pain in your neck, abdomen or chest, vomiting of blood, oral temperature over 100 degrees Fahrenheit, light-headedness or dizziness, or any other problems, contact your doctor.    **If unable to reach your doctor, please go to the [facility] Emergency Room**    - Your referring physician will receive a full report of your examination.  - If you do not hear from your doctor's office within two weeks of your biopsy, please call them for your results.    You may be able to see your laboratory results in [facility] between 4 and 7 business days.  In some cases, your physician may not have viewed the results before they are released to [facility].  If you have questions regarding your results contact the physician who ordered the test/exam by phone or via [facility] by choosing \"Ask a Medical Question.\"

## 2024-02-12 NOTE — ANESTHESIA POSTPROCEDURE EVALUATION
[facility]    Jak Esquivel Patient Status:  Hospital Outpatient Surgery   Age/Gender 39 year old male MRN [id number]   Location [facility] ENDOSCOPY PAIN CENTER Attending Ilya Villegas MD   Hosp Day # 0 PCP Tonia Gudino MD       Anesthesia Post-op Note    COLONOSCOPY with cold snare polypectomy    Procedure Summary       Date: 02/12/24 Room / Location:  ENDOSCOPY 02 / [facility] ENDOSCOPY    Anesthesia Start: 0849 Anesthesia Stop: 0916    Procedure: COLONOSCOPY with cold snare polypectomy Diagnosis:       Colon cancer screening      (polyp)    Surgeons: Ilya Villegas MD Anesthesiologist: Jesus Zhang MD    Anesthesia Type: MAC ASA Status: 3            Anesthesia Type: MAC    Vitals Value Taken Time   /65 02/12/24 0921   Temp n 02/12/24 0922   Pulse 84 02/12/24 0921   Resp 14 02/12/24 0922   SpO2 96 % 02/12/24 0921   Vitals shown include unfiled device data.    Patient Location: Endoscopy    Anesthesia Type: MAC    Airway Patency: patent    Postop Pain Control: adequate    Mental Status: mildly sedated but able to meaningfully participate in the post-anesthesia evaluation    Nausea/Vomiting: none    Cardiopulmonary/Hydration status: stable euvolemic    Complications: no apparent anesthesia related complications    Postop vital signs: stable    Dental Exam: Unchanged from Preop    Patient to be discharged home when criteria met.

## 2024-02-12 NOTE — ANESTHESIA PREPROCEDURE EVALUATION
PRE-OP EVALUATION    Patient Name: Jak Esquivel    Admit Diagnosis: Colon cancer screening [Z12.11]    Pre-op Diagnosis: Colon cancer screening [Z12.11]    COLONOSCOPY    Anesthesia Procedure: COLONOSCOPY    Surgeon(s) and Role:     * Ilya Villegas MD - Primary    Pre-op vitals reviewed.  Temp: 98.9 °F (37.2 °C)  Pulse: 88  Resp: 16  BP: 134/89  SpO2: 98 %  Body mass index is 50.06 kg/m².    Current medications reviewed.  Hospital Medications:   glucose (Dex4) 15 GM/59ML oral liquid 15 g  15 g Oral Q15 Min PRN    Or    glucose (Glutose) 40% oral gel 15 g  15 g Oral Q15 Min PRN    Or    glucose-vitamin C (Dex-4) chewable tab 4 tablet  4 tablet Oral Q15 Min PRN    Or    dextrose 50% injection 50 mL  50 mL Intravenous Q15 Min PRN    Or    glucose (Dex4) 15 GM/59ML oral liquid 30 g  30 g Oral Q15 Min PRN    Or    glucose (Glutose) 40% oral gel 30 g  30 g Oral Q15 Min PRN    Or    glucose-vitamin C (Dex-4) chewable tab 8 tablet  8 tablet Oral Q15 Min PRN    lactated ringers infusion   Intravenous Continuous       Outpatient Medications:     Medications Prior to Admission   Medication Sig Dispense Refill Last Dose    PEG 3350-KCl-NaBcb-NaCl-NaSulf (PEG-3350/ELECTROLYTES) 236 g Oral Recon Soln Take as directed by physician 4000 mL 0 2/11/2024    amLODIPine 2.5 MG Oral Tab Take 1 tablet (2.5 mg total) by mouth 2 (two) times daily.   2/11/2024    hydroCHLOROthiazide 25 MG Oral Tab Take 1 tablet (25 mg total) by mouth daily. 90 tablet 3 2/11/2024    lisinopril 20 MG Oral Tab Take 1 tablet (20 mg total) by mouth in the morning and 1 tablet (20 mg total) before bedtime. 60 tablet 0 2/12/2024    metFORMIN  MG Oral Tablet 24 Hr Take 1 tablet (750 mg total) by mouth 2 (two) times daily with meals. 180 tablet 0 2/11/2024    ACCU-CHEK GUIDE In Vitro Strip USE ONE NEW TEST STRIP EACH TIME TO TEST BLOOD SUGARS IN THE MORNING, AT NOON AND AT BEDTIME 100 strip 2 2/11/2024    Blood Glucose Monitoring Suppl (ACCU-CHEK GUIDE ME)  w/Device Does not apply Kit 1 each daily. 1 kit 0 2/11/2024    Accu-Chek FastClix Lancets Does not apply Misc Test three times daily. 300 each 0 2/11/2024    omeprazole 20 MG Oral Capsule Delayed Release Take 1 capsule (20 mg total) by mouth every morning before breakfast.   2/11/2024       Allergies: Patient has no known allergies.      Anesthesia Evaluation    Patient summary reviewed.    Anesthetic Complications  (-) history of anesthetic complications         GI/Hepatic/Renal    Negative GI/hepatic/renal ROS.                             Cardiovascular                  (+) hypertension                                     Endo/Other      (+) diabetes and poorly controlled, type 2, not using insulin                         Pulmonary                    (+) sleep apnea       Neuro/Psych    Negative neuro/psych ROS.                                  Past Surgical History:   Procedure Laterality Date    APPENDECTOMY  2000     Social History     Socioeconomic History    Marital status: Single   Tobacco Use    Smoking status: Never    Smokeless tobacco: Never   Vaping Use    Vaping Use: Never used   Substance and Sexual Activity    Alcohol use: Yes     Comment: social    Drug use: Never     History   Drug Use Unknown     Available pre-op labs reviewed.               Airway      Mallampati: III  Mouth opening: 3 FB  TM distance: 4 - 6 cm  Neck ROM: full Cardiovascular    Cardiovascular exam normal.         Dental             Pulmonary    Pulmonary exam normal.                 Other findings              ASA: 3   Plan: MAC  NPO status verified and patient meets guidelines.        Comment: We have decided on MAC for this procedure. MAC stands for Monitored Anesthesia Care and is a type of sedation. The patient will be given medications through the IV to relax the patient and help with pain control. During MAC the patient will be breathing on their own during the case. The patient has a risk of awareness before, during and  after the procedure. There is also a risk of requiring general anesthesia and placement of a breathing tube should the patient not breathe well or should MAC not provide adequate sedation for the patient to tolerate the procedure. All questions were answered.                      Present on Admission:  **None**

## 2024-02-14 NOTE — PROGRESS NOTES
Patient needs repeat colonoscopy in 5 years.  Please enter recall and update health maintenance.      ----    Hi Jak,    You recently had a colonoscopy procedure completed with biopsies of the lining of your colon.     The biopsies obtained from your recent colonoscopy indicate that the polyp was an adenoma, which, although benign (no evidence of cancer), is considered potentially pre-cancerous and you will need long term follow-up.    I am advising you to undergo a repeat colonoscopy in 5 years, or sooner if you were to develop any new symptoms, and at periodic intervals thereafter. We will send you a reminder card when the time of your procedure is near.    Please call the office if you have any questions or concerns about these results.     Sincerely,    Ilya Villegas MD  [facility]  [phone]

## 2024-07-05 DIAGNOSIS — E11.9 TYPE 2 DIABETES MELLITUS WITHOUT COMPLICATION, WITHOUT LONG-TERM CURRENT USE OF INSULIN (HCC): ICD-10-CM

## 2024-07-05 NOTE — TELEPHONE ENCOUNTER
LOV:  11/14/23 for diabetes      metFORMIN  MG Oral Tablet 24 Hr  Take 1 tablet (750 mg total) by mouth 2 (two) times daily with meals. Dispense: 180 tablet, Refills: 0 ordered        07/10/2023       amLODIPine 2.5 MG Oral Tab  Take 1 tablet (2.5 mg total) by mouth 2 (two) times daily.        10/30/2023     No future appointments.

## 2024-07-06 RX ORDER — AMLODIPINE BESYLATE 2.5 MG/1
2.5 TABLET ORAL 2 TIMES DAILY
Qty: 60 TABLET | Refills: 0 | Status: SHIPPED | OUTPATIENT
Start: 2024-07-06 | End: 2024-08-05

## 2024-07-06 RX ORDER — METFORMIN HYDROCHLORIDE 750 MG/1
750 TABLET, EXTENDED RELEASE ORAL 2 TIMES DAILY WITH MEALS
Qty: 60 TABLET | Refills: 0 | Status: SHIPPED | OUTPATIENT
Start: 2024-07-06 | End: 2024-08-05

## 2024-07-07 LAB
ALBUMIN/GLOBULIN RATIO: 1.7 (CALC) (ref 1–2.5)
ALBUMIN: 4.2 G/DL (ref 3.6–5.1)
ALKALINE PHOSPHATASE: 63 U/L (ref 36–130)
ALT: 110 U/L (ref 9–46)
AST: 60 U/L (ref 10–40)
BILIRUBIN, TOTAL: 0.8 MG/DL (ref 0.2–1.2)
BUN: 15 MG/DL (ref 7–25)
CALCIUM: 9.3 MG/DL (ref 8.6–10.3)
CARBON DIOXIDE: 29 MMOL/L (ref 20–32)
CHLORIDE: 96 MMOL/L (ref 98–110)
CHOL/HDLC RATIO: 6.1 (CALC)
CHOLESTEROL, TOTAL: 219 MG/DL
CREATININE, RANDOM URINE: 78 MG/DL (ref 20–320)
CREATININE: 0.78 MG/DL (ref 0.6–1.26)
EGFR: 117 ML/MIN/1.73M2
GLOBULIN: 2.5 G/DL (CALC) (ref 1.9–3.7)
GLUCOSE: 352 MG/DL (ref 65–99)
HDL CHOLESTEROL: 36 MG/DL
HEMATOCRIT: 48.4 % (ref 38.5–50)
HEMOGLOBIN A1C: 12.5 % OF TOTAL HGB
HEMOGLOBIN: 15.9 G/DL (ref 13.2–17.1)
LDL-CHOLESTEROL: 140 MG/DL (CALC)
MCH: 31 PG (ref 27–33)
MCHC: 32.9 G/DL (ref 32–36)
MCV: 94.3 FL (ref 80–100)
MICROALBUMIN/CREATININE RATIO, RANDOM URINE: 22 MG/G CREAT
MICROALBUMIN: 1.7 MG/DL
MPV: 12.7 FL (ref 7.5–12.5)
NON-HDL CHOLESTEROL: 183 MG/DL (CALC)
PLATELET COUNT: 154 THOUSAND/UL (ref 140–400)
POTASSIUM: 4.4 MMOL/L (ref 3.5–5.3)
PROTEIN, TOTAL: 6.7 G/DL (ref 6.1–8.1)
RDW: 12.3 % (ref 11–15)
RED BLOOD CELL COUNT: 5.13 MILLION/UL (ref 4.2–5.8)
SODIUM: 133 MMOL/L (ref 135–146)
TRIGLYCERIDES: 302 MG/DL
WHITE BLOOD CELL COUNT: 6.3 THOUSAND/UL (ref 3.8–10.8)

## 2024-07-10 DIAGNOSIS — R79.89 ELEVATED LFTS: Primary | ICD-10-CM

## 2024-07-12 DIAGNOSIS — R74.8 ELEVATED LIVER ENZYMES: Primary | ICD-10-CM

## 2024-07-12 DIAGNOSIS — E11.9 TYPE 2 DIABETES MELLITUS WITHOUT COMPLICATION, WITHOUT LONG-TERM CURRENT USE OF INSULIN (HCC): ICD-10-CM

## 2024-07-20 DIAGNOSIS — E11.9 TYPE 2 DIABETES MELLITUS WITHOUT COMPLICATION, WITHOUT LONG-TERM CURRENT USE OF INSULIN (HCC): ICD-10-CM

## 2024-07-24 RX ORDER — METFORMIN HYDROCHLORIDE 750 MG/1
750 TABLET, EXTENDED RELEASE ORAL 2 TIMES DAILY WITH MEALS
Qty: 60 TABLET | Refills: 11 | OUTPATIENT
Start: 2024-07-24

## 2024-07-29 RX ORDER — AMLODIPINE BESYLATE 2.5 MG/1
2.5 TABLET ORAL 2 TIMES DAILY
Qty: 60 TABLET | Refills: 11 | Status: SHIPPED | OUTPATIENT
Start: 2024-07-29

## 2024-07-29 NOTE — TELEPHONE ENCOUNTER
Hypertension Medications Protocol [id number]/29/2024 04:37 AM   Protocol Details CMP or BMP in past 12 months    Last BP reading less than 140/90    In person appointment or virtual visit in the past 12 mos or appointment in next 3 mos    EGFRCR or GFRNAA > 50     Request for AMLODIPINE 2.5 MG Oral Tab     LOV 11/14/23 with    Last refill 7/6/24 -60 tablets 0 refill   Future Appointments   Date Time Provider Department Center   8/5/2024  2:20 PM Rosa Maria Curiel APRN [facility]   Labs 7/6/2024

## 2024-08-05 ENCOUNTER — OFFICE VISIT (OUTPATIENT)
Dept: FAMILY MEDICINE CLINIC | Facility: CLINIC | Age: 39
End: 2024-08-05
Payer: COMMERCIAL

## 2024-08-05 VITALS
SYSTOLIC BLOOD PRESSURE: 122 MMHG | WEIGHT: 315 LBS | TEMPERATURE: 98 F | BODY MASS INDEX: 46.65 KG/M2 | DIASTOLIC BLOOD PRESSURE: 84 MMHG | HEIGHT: 69 IN | HEART RATE: 77 BPM | RESPIRATION RATE: 18 BRPM | OXYGEN SATURATION: 99 %

## 2024-08-05 DIAGNOSIS — E78.5 HYPERLIPIDEMIA LDL GOAL <70: ICD-10-CM

## 2024-08-05 DIAGNOSIS — R74.8 ELEVATED LIVER ENZYMES: ICD-10-CM

## 2024-08-05 DIAGNOSIS — I10 PRIMARY HYPERTENSION: ICD-10-CM

## 2024-08-05 DIAGNOSIS — Z00.00 GENERAL MEDICAL EXAM: Primary | ICD-10-CM

## 2024-08-05 DIAGNOSIS — E78.2 MIXED HYPERLIPIDEMIA: ICD-10-CM

## 2024-08-05 DIAGNOSIS — E11.65 TYPE 2 DIABETES MELLITUS WITH HYPERGLYCEMIA, WITHOUT LONG-TERM CURRENT USE OF INSULIN (HCC): ICD-10-CM

## 2024-08-05 DIAGNOSIS — E11.9 TYPE 2 DIABETES MELLITUS WITHOUT COMPLICATION, WITHOUT LONG-TERM CURRENT USE OF INSULIN (HCC): ICD-10-CM

## 2024-08-05 DIAGNOSIS — Z23 NEED FOR VACCINATION: ICD-10-CM

## 2024-08-05 DIAGNOSIS — E11.649 UNCONTROLLED TYPE 2 DIABETES MELLITUS WITH HYPOGLYCEMIA WITHOUT COMA (HCC): ICD-10-CM

## 2024-08-05 DIAGNOSIS — E66.01 MORBID OBESITY (HCC): ICD-10-CM

## 2024-08-05 DIAGNOSIS — I10 ESSENTIAL HYPERTENSION: ICD-10-CM

## 2024-08-05 RX ORDER — HYDROCHLOROTHIAZIDE 25 MG/1
25 TABLET ORAL DAILY
Qty: 90 TABLET | Refills: 1 | Status: SHIPPED | OUTPATIENT
Start: 2024-08-05

## 2024-08-05 RX ORDER — LISINOPRIL 20 MG/1
20 TABLET ORAL 2 TIMES DAILY
Qty: 180 TABLET | Refills: 1 | Status: SHIPPED | OUTPATIENT
Start: 2024-08-05 | End: 2024-11-03

## 2024-08-05 RX ORDER — AMLODIPINE BESYLATE 2.5 MG/1
2.5 TABLET ORAL 2 TIMES DAILY
Qty: 180 TABLET | Refills: 1 | Status: SHIPPED | OUTPATIENT
Start: 2024-08-05 | End: 2024-11-03

## 2024-08-05 NOTE — PROGRESS NOTES
HPI:   Patient is here for physical today and diabetes follow up.    Exercise: playing volleyball with daughter, walking, golf  Diet: balanced    Current Weight: 339 lb  Body mass index is 50.06 kg/m².  Current DM Medications: Metformin 750 mg XR daily (had been out for about a month prior to last lab work)  Medication Side Effects: none  ACE/ARB: Lisinopril  Statin: None currently  Last Diabetic Eye Exam: a few weeks ago, told it was normal  Last Diabetic Foot Exam: 11/2023    Lab Results   Component Value Date    A1C 12.5 (H) 07/06/2024    A1C 7.0 (H) 03/10/2023    A1C 11.4 (H) 12/27/2022       Recent Results (from the past 4380 hour(s))   Comp Metabolic Panel (14)    Collection Time: 07/06/24  7:04 AM   Result Value Ref Range    GLUCOSE 352 (H) 65 - 99 mg/dL     Comment:               Fasting reference interval     For someone without known diabetes, a glucose  value >125 mg/dL indicates that they may have  diabetes and this should be confirmed with a  follow-up test.         UREA NITROGEN (BUN) 15 7 - 25 mg/dL    CREATININE 0.78 0.60 - 1.26 mg/dL    EGFR 117 > OR = 60 mL/min/1.73m2    BUN/CREATININE RATIO SEE NOTE: 6 - 22 (calc)     Comment:    Not Reported: BUN and Creatinine are within     reference range.            SODIUM 133 (L) 135 - 146 mmol/L    POTASSIUM 4.4 3.5 - 5.3 mmol/L    CHLORIDE 96 (L) 98 - 110 mmol/L    CARBON DIOXIDE 29 20 - 32 mmol/L    CALCIUM 9.3 8.6 - 10.3 mg/dL    PROTEIN, TOTAL 6.7 6.1 - 8.1 g/dL    ALBUMIN 4.2 3.6 - 5.1 g/dL    GLOBULIN 2.5 1.9 - 3.7 g/dL (calc)    ALBUMIN/GLOBULIN RATIO 1.7 1.0 - 2.5 (calc)    BILIRUBIN, TOTAL 0.8 0.2 - 1.2 mg/dL    ALKALINE PHOSPHATASE 63 36 - 130 U/L    AST 60 (H) 10 - 40 U/L     (H) 9 - 46 U/L      Cholesterol: 219, done on 7/6/2024.  HDL Cholesterol: 36, done on 7/6/2024.  LDL Cholesterol: 140, done on 7/6/2024.  TriGlycerides 302, done on 7/6/2024.    Wt Readings from Last 6 Encounters:   08/05/24 (!) 339 lb (153.8 kg)   02/12/24 (!) 339 lb  (153.8 kg)   11/14/23 (!) 339 lb (153.8 kg)   01/07/23 (!) 314 lb (142.4 kg)   12/27/22 (!) 320 lb 6.4 oz (145.3 kg)   08/08/20 (!) 360 lb (163.3 kg)     Body mass index is 50.06 kg/m².   Immunization History   Administered Date(s) Administered    Covid-19 Vaccine Moderna 100 mcg/0.5 ml 10/25/2021, 11/22/2021    TDAP 08/05/2024   History:  Past Medical History:    Diabetes (HCC)    High blood pressure    HTN (hypertension)    LVH (left ventricular hypertrophy)    Sleep apnea    no device yet at time of screen 1/24    Visual impairment    glasses      Past Surgical History:   Procedure Laterality Date    Appendectomy  2000    Colonoscopy N/A 2/12/2024    Procedure: COLONOSCOPY with cold snare polypectomy;  Surgeon: Ilya Villegas MD;  Location:  ENDOSCOPY      History reviewed. No pertinent family history.   No family status information on file.      Social History     Socioeconomic History    Marital status: Single   Tobacco Use    Smoking status: Never    Smokeless tobacco: Never   Vaping Use    Vaping status: Never Used   Substance and Sexual Activity    Alcohol use: Yes     Comment: social    Drug use: Never        REVIEW OF SYSTEMS:   Review of Systems   Constitutional:  Negative for appetite change, chills, fatigue, fever and unexpected weight change.   HENT:  Negative for congestion, ear pain, postnasal drip, rhinorrhea, sore throat and trouble swallowing.    Respiratory:  Negative for cough and shortness of breath.    Cardiovascular:  Negative for chest pain and palpitations.   Gastrointestinal:  Negative for abdominal pain, constipation, diarrhea, nausea and vomiting.   Endocrine: Positive for polydipsia (when off Metformin) and polyphagia (when off Metformin). Negative for cold intolerance, heat intolerance and polyuria.   Genitourinary:  Negative for dysuria and frequency.   Musculoskeletal:  Negative for myalgias.   Skin:  Negative for rash.   Neurological:  Negative for headaches.    Psychiatric/Behavioral:  Negative for dysphoric mood and sleep disturbance. The patient is not nervous/anxious.         Objective   EXAM:   /84   Pulse 77   Temp 98 °F (36.7 °C) (Temporal)   Resp 18   Ht 5' 9\" (1.753 m)   Wt (!) 339 lb (153.8 kg)   SpO2 99%   BMI 50.06 kg/m²   Patient weight not recorded   Physical Exam  Constitutional:       General: He is not in acute distress.     Appearance: Normal appearance. He is well-developed and well-groomed. He is obese. He is not ill-appearing.   HENT:      Right Ear: Tympanic membrane, ear canal and external ear normal.      Left Ear: Tympanic membrane, ear canal and external ear normal.      Nose: Nose normal.      Mouth/Throat:      Mouth: Mucous membranes are moist.      Pharynx: Oropharynx is clear.   Eyes:      Conjunctiva/sclera: Conjunctivae normal.      Pupils: Pupils are equal, round, and reactive to light.   Neck:      Thyroid: No thyromegaly.   Cardiovascular:      Rate and Rhythm: Normal rate and regular rhythm.      Heart sounds: Normal heart sounds.   Pulmonary:      Effort: Pulmonary effort is normal.      Breath sounds: Normal breath sounds.   Abdominal:      General: Bowel sounds are normal.      Palpations: Abdomen is soft.      Tenderness: There is no abdominal tenderness.   Musculoskeletal:         General: Normal range of motion.      Cervical back: Normal range of motion.   Skin:     General: Skin is warm and dry.   Neurological:      Mental Status: He is alert and oriented to person, place, and time.      Cranial Nerves: No cranial nerve deficit.   Psychiatric:         Mood and Affect: Mood normal.         Behavior: Behavior normal.              ASSESSMENT AND PLAN     Diagnoses and all orders for this visit:    General medical exam    Type 2 diabetes mellitus with hyperglycemia, without long-term current use of insulin ([facility])  -     [facility] Provider  -     HGB A1C [496] [Q]; Future  -     COMP METABOLIC  PANEL [49667] [Q]; Future  -     LIPID PANEL [7600] [Q]; Future  -     metFORMIN HCl 1000 MG Oral Tab; Take 1 tablet (1,000 mg total) by mouth 2 (two) times daily with meals.    Uncontrolled type 2 diabetes mellitus with hypoglycemia without coma (HCC)  -     [facility] Provider  -     HGB A1C [496] [Q]; Future  -     COMP METABOLIC PANEL [15665] [Q]; Future  -     LIPID PANEL [7600] [Q]; Future  -     metFORMIN HCl 1000 MG Oral Tab; Take 1 tablet (1,000 mg total) by mouth 2 (two) times daily with meals.    Morbid obesity (HCC)    Mixed hyperlipidemia  -     COMP METABOLIC PANEL [10313] [Q]; Future  -     LIPID PANEL [7600] [Q]; Future    Hyperlipidemia LDL goal <70  -     COMP METABOLIC PANEL [85049] [Q]; Future  -     LIPID PANEL [7600] [Q]; Future    Elevated liver enzymes  -     US ABDOMEN COMPLETE (CPT=76700); Future  -     COMP METABOLIC PANEL [71494] [Q]; Future    Primary hypertension  -     hydroCHLOROthiazide 25 MG Oral Tab; Take 1 tablet (25 mg total) by mouth daily.  -     lisinopril 20 MG Oral Tab; Take 1 tablet (20 mg total) by mouth in the morning and 1 tablet (20 mg total) before bedtime.  -     amLODIPine 2.5 MG Oral Tab; Take 1 tablet (2.5 mg total) by mouth 2 (two) times daily.    Essential hypertension    Type 2 diabetes mellitus without complication, without long-term current use of insulin (HCC)    Need for vaccination  -     TdaP (Adacel, Boostrix) [58007]    Reviewed recent lab work  Diabetes not controlled. Is back on Metformin but would recommend establishing with diabetic APN. Referral placed  Blood pressure well controlled, Continue present management  Would consider statin but given elevated liver enzymes, needs liver us first  Plan to recheck lab work in 3 months  Will obtain copy of recent diabetic eye exam

## 2024-08-09 ENCOUNTER — TELEPHONE (OUTPATIENT)
Dept: FAMILY MEDICINE CLINIC | Facility: CLINIC | Age: 39
End: 2024-08-09

## 2024-09-04 ENCOUNTER — TELEPHONE (OUTPATIENT)
Dept: FAMILY MEDICINE CLINIC | Facility: CLINIC | Age: 39
End: 2024-09-04

## 2024-09-04 NOTE — TELEPHONE ENCOUNTER
Labs due  Mychart message sent  Future Appointments   Date Time Provider Department Center   9/11/2024  8:15 AM PFS US [facility]

## 2024-09-11 ENCOUNTER — HOSPITAL ENCOUNTER (OUTPATIENT)
Dept: ULTRASOUND IMAGING | Age: 39
Discharge: HOME | End: 2024-09-11
Attending: NURSE PRACTITIONER
Payer: COMMERCIAL

## 2024-09-11 ENCOUNTER — TELEPHONE (OUTPATIENT)
Dept: FAMILY MEDICINE CLINIC | Facility: CLINIC | Age: 39
End: 2024-09-11

## 2024-09-11 DIAGNOSIS — R74.8 ELEVATED LIVER ENZYMES: ICD-10-CM

## 2024-09-11 PROCEDURE — 76700 US EXAM ABDOM COMPLETE: CPT | Performed by: NURSE PRACTITIONER

## 2024-09-11 NOTE — TELEPHONE ENCOUNTER
----- Message from Rosa Maria Curiel sent at 9/11/2024  9:26 AM CDT -----  Results reviewed.   Fatty liver disease noted  Patient needs to work on weight loss and improved diabetic control. Referred to Diabetic APRN but no upcoming appointment. Please remind him  Also needs repeat labs in October. A1C, CMP

## 2024-09-29 DIAGNOSIS — E11.649 UNCONTROLLED TYPE 2 DIABETES MELLITUS WITH HYPOGLYCEMIA WITHOUT COMA (HCC): ICD-10-CM

## 2024-09-29 DIAGNOSIS — E11.65 TYPE 2 DIABETES MELLITUS WITH HYPERGLYCEMIA, WITHOUT LONG-TERM CURRENT USE OF INSULIN (HCC): ICD-10-CM

## 2024-09-30 NOTE — TELEPHONE ENCOUNTER
Diabetes Medication Protocol [id number]/29/2024 09:38 PM   Protocol Details Last A1C < 7.5 and within past 6 months    In person appointment or virtual visit in the past 6 mos or appointment in next 3 mos    Microalbumin procedure in past 12 months or taking ACE/ARB    EGFRCR or GFRNAA > 50    GFR in the past 12 months      Last office visit 8/5/24  Last refilled on 8/5/24 for # 180 with 0 refills  No future appointments.   Last A1c 12.5 on 7/6/24  Thank you.

## 2024-09-30 NOTE — TELEPHONE ENCOUNTER
1 year supply denied  90 day supply sent in August  Due for office visit and labs. Orders at [facility]  Please call him

## 2024-10-09 ENCOUNTER — TELEPHONE (OUTPATIENT)
Dept: FAMILY MEDICINE CLINIC | Facility: CLINIC | Age: 39
End: 2024-10-09

## 2024-10-09 NOTE — TELEPHONE ENCOUNTER
Labs due  Letter sent  No future appointments.       Jak     Our nurse practitioner, Daniela, reviewed your ultrasound results.  Fatty liver disease noted.  She recommends that you work on weight loss and improve your diabetic control.  She did refer you to the diabetic nurse practitioner - please call and schedule an appointment with her.  Adri Gallagher, APRN [id number]  [facility]  [facility] [address] [phone]      Also you need to repeat labs in October - A1C and CMP (complete metabolic panel)    It looks like you use [facility] so we will fax the lab orders over. Please call and schedule an appointment with them.     Please let us know if you have any questions!     Eleanor JASON, RN  [facility], [place]   Phone: [phone]     Last read by Jak Esquivel at 10:02 AM on 9/11/2024.

## 2024-10-12 ENCOUNTER — TELEPHONE (OUTPATIENT)
Dept: FAMILY MEDICINE CLINIC | Facility: CLINIC | Age: 39
End: 2024-10-12

## 2024-10-25 ENCOUNTER — TELEPHONE (OUTPATIENT)
Dept: FAMILY MEDICINE CLINIC | Facility: CLINIC | Age: 39
End: 2024-10-25

## 2024-11-06 ENCOUNTER — OFFICE VISIT (OUTPATIENT)
Dept: FAMILY MEDICINE CLINIC | Facility: CLINIC | Age: 39
End: 2024-11-06
Payer: COMMERCIAL

## 2024-11-06 VITALS
HEIGHT: 69 IN | HEART RATE: 104 BPM | BODY MASS INDEX: 45.62 KG/M2 | RESPIRATION RATE: 16 BRPM | WEIGHT: 308 LBS | OXYGEN SATURATION: 96 % | DIASTOLIC BLOOD PRESSURE: 80 MMHG | SYSTOLIC BLOOD PRESSURE: 129 MMHG | TEMPERATURE: 97 F

## 2024-11-06 DIAGNOSIS — E11.65 UNCONTROLLED TYPE 2 DIABETES MELLITUS WITH HYPERGLYCEMIA, WITHOUT LONG-TERM CURRENT USE OF INSULIN (HCC): Primary | ICD-10-CM

## 2024-11-06 DIAGNOSIS — E78.2 MIXED HYPERLIPIDEMIA: ICD-10-CM

## 2024-11-06 DIAGNOSIS — Z28.21 VACCINATION DECLINED: ICD-10-CM

## 2024-11-06 DIAGNOSIS — R74.8 ELEVATED LIVER ENZYMES: ICD-10-CM

## 2024-11-06 DIAGNOSIS — K76.0 STEATOSIS OF LIVER: ICD-10-CM

## 2024-11-06 DIAGNOSIS — I10 PRIMARY HYPERTENSION: ICD-10-CM

## 2024-11-06 LAB
CARTRIDGE LOT#: ABNORMAL NUMERIC
HEMOGLOBIN A1C: 10.8 % (ref 4.3–5.6)

## 2024-11-06 PROCEDURE — 83036 HEMOGLOBIN GLYCOSYLATED A1C: CPT

## 2024-11-06 PROCEDURE — 99214 OFFICE O/P EST MOD 30 MIN: CPT

## 2024-11-06 RX ORDER — LISINOPRIL 20 MG/1
TABLET ORAL
COMMUNITY
Start: 2024-10-16

## 2024-11-06 RX ORDER — AMLODIPINE BESYLATE 2.5 MG/1
2.5 TABLET ORAL 2 TIMES DAILY
COMMUNITY

## 2024-11-06 RX ORDER — TIRZEPATIDE 2.5 MG/.5ML
2.5 INJECTION, SOLUTION SUBCUTANEOUS WEEKLY
Qty: 2 ML | Refills: 0 | Status: SHIPPED | OUTPATIENT
Start: 2024-11-06

## 2024-11-06 NOTE — PROGRESS NOTES
Chief Complaint   Patient presents with    Follow - Up     Cravings unsure if metformin   Face acne          HPI  Jak Esquivel is a 39 year old M pt who presents today for DM follow up    His last A1c was 12.5%, today POC A1C is 10.8%.    He is currently only on metformin 1000 mg twice a day and has not seen an endocrinologist before. He checks his sugars at home, which range from 90s fasting to 200s postprandial.   He has not yet seen a dietitian but he has an appointment with the weight loss clinic soon.     He admits that his diet is not good due to frequent work-related travel, making it difficult to maintain a routine.   He engaged in golf and volleyball during the summer but does not have a daily exercise routine.   He had an eye exam a couple of months ago with no concerns for retinopathy    He denies any numbness, tingling, or pain in his lower extremities      ROS  As per HPI    Past Medical History:    Diabetes (HCC)    High blood pressure    HTN (hypertension)    LVH (left ventricular hypertrophy)    Sleep apnea    no device yet at time of screen 1/24    Visual impairment    glasses       Past Surgical History:   Procedure Laterality Date    Appendectomy  2000    Colonoscopy N/A 2/12/2024    Procedure: COLONOSCOPY with cold snare polypectomy;  Surgeon: Ilya Villegas MD;  Location:  ENDOSCOPY       Social History     Socioeconomic History    Marital status: Single   Tobacco Use    Smoking status: Never    Smokeless tobacco: Never   Vaping Use    Vaping status: Never Used   Substance and Sexual Activity    Alcohol use: Yes     Comment: social    Drug use: Never       Family History   Problem Relation Age of Onset    Breast Cancer Mother     Heart Attack Mother     Heart Disease Mother     Colon Cancer Sister         Medications Ordered Prior to Encounter[1]      Objective  Vitals:    11/06/24 1159   BP: 129/80   Pulse: 104   Resp: 16   Temp: 97.3 °F (36.3 °C)   SpO2: 96%   Weight: (!) 308 lb (139.7  kg)   Height: 5' 9\" (1.753 m)   Body mass index is 45.48 kg/m².    Physical Exam  Constitutional:       Appearance: Normal appearance.   HEENT:      Head: Normocephalic and atraumatic.   Cardiovascular:      Rate and Rhythm: Normal rate and regular rhythm.   Pulmonary:      Effort: Pulmonary effort is normal.      Breath sounds: Normal breath sounds.   Abdominal:      General: Bowel sounds are normal.      Palpations: Abdomen is soft. There is no mass.   Musculoskeletal:         General: Normal range of motion.   Skin:     General: Skin is warm and dry.   Neurological:      General: No focal deficit present.      Mental Status: Alert and oriented to person, place, and time.   Psychiatric:         Mood and Affect: Mood normal.         Thought Content: Thought content normal.     Diabetic foot exam:  Right Foot  Foot Inspection: no ulcer, sores, infection, + callus, no angie deformity and skin integrity good and hair present  Biomechanics: normal pronation, supination, and dorsiflexion  Vascular: normal DP pulse, PT pulse, skin temp, and capillary refill  Monofilament: normal     Left Foot  Foot Inspection: no ulcer, sores, + L great toe nail onychomycosis, + callus, no angie deformity and skin integrity good and hair present  Biomechanics: normal pronation, supination, and dorsiflexion  Vascular: normal DP pulse, PT pulse, skin temp, and capillary refill  Monofilament: normal        Assessment and Plan  Jak was seen today for follow - up.    Diagnoses and all orders for this visit:    Uncontrolled type 2 diabetes mellitus with hyperglycemia, without long-term current use of insulin ([facility])  -     POC Hemoglobin A1C  -     Tirzepatide (MOUNJARO) 2.5 MG/0.5ML Subcutaneous Solution Auto-injector; Inject 2.5 mg into the skin once a week.  -     Podiatry Referral - In Network  -     EDUCATION-OP REFERRAL INADEQUATE GLYCEMIC CONTROL/CHANGE TMT  -     Endocrine Referral - External    Steatosis of  liver  Comments:  Discussed healthy diet, weight loss and 150 min mod intensity exercise weekly  Start Mounjaro for DM and weight loss    Elevated liver enzymes  Comments:  D/t Fatty liver  Discussed healthy diet, weight loss and 150 min mod intensity exercise weekly  Start Mounjaro for DM, weight loss   Repeat labs at next visit    Mixed hyperlipidemia  Comments:  Discussed healthy diet, weight loss and 150 min mod intensity exercise weekly  LDL: 140, start atorvastatin  Repeat labs at next visit  Orders:  -     atorvastatin 10 MG Oral Tab; Take 1 tablet (10 mg total) by mouth nightly.    Primary hypertension  Comments:  Well controlled  Continue lisinopril and amlodipine as prescribed    Vaccination declined         Today's HbA1c 10.8  Patient is not controlled and not at goal  DM eye exam: UTD  DM foot exam:  done 11/6/2024  Urine micro/ GFR: wnl 7/6/2024  Plan to add Jardiance during next visit and repeat labs  Patient admits to compliance with medication regimen  - Discussed consequences of not adhering to medication regimen and having uncontrolled blood sugars, including damage to eyes, kidneys, nerves, feet, hands leading to blindness, need for dialysis, poorly healing wounds and ulcers leading to amputations, heart disease including increased risk for heart attack and death  Patient expressed understanding of all of the above  - Refill provided for meds  - To re-evaluate blood glucose control at next visit        Follow up  Return in about 4 weeks (around 12/4/2024) for medication follow up.      Patient Instructions  Patient Instructions   It was nice to meet you!  I will reach out via [employer]        Liz Mcwilliams MD          [1]   Current Outpatient Medications on File Prior to Visit   Medication Sig Dispense Refill    lisinopril 20 MG Oral Tab       metFORMIN HCl 1000 MG Oral Tab Take 1 tablet (1,000 mg total) by mouth 2 (two) times daily with meals.      amLODIPine 2.5 MG Oral Tab Take 1 tablet (2.5 mg  total) by mouth in the morning and 1 tablet (2.5 mg total) before bedtime.      hydroCHLOROthiazide 25 MG Oral Tab Take 1 tablet (25 mg total) by mouth daily. 90 tablet 1    omeprazole 20 MG Oral Capsule Delayed Release Take 1 capsule (20 mg total) by mouth every morning before breakfast.      ACCU-CHEK GUIDE In Vitro Strip USE ONE NEW TEST STRIP EACH TIME TO TEST BLOOD SUGARS IN THE MORNING, AT NOON AND AT BEDTIME 100 strip 2    Blood Glucose Monitoring Suppl (ACCU-CHEK GUIDE ME) w/Device Does not apply Kit 1 each daily. 1 kit 0    Accu-Chek FastClix Lancets Does not apply Misc Test three times daily. 300 each 0     No current facility-administered medications on file prior to visit.

## 2024-11-07 PROBLEM — K76.0 STEATOSIS OF LIVER: Status: ACTIVE | Noted: 2024-11-07

## 2024-11-07 RX ORDER — ATORVASTATIN CALCIUM 10 MG/1
10 TABLET, FILM COATED ORAL NIGHTLY
Qty: 90 TABLET | Refills: 0 | Status: SHIPPED | OUTPATIENT
Start: 2024-11-07

## 2024-11-08 PROBLEM — E11.65 UNCONTROLLED TYPE 2 DIABETES MELLITUS WITH HYPERGLYCEMIA, WITHOUT LONG-TERM CURRENT USE OF INSULIN (HCC): Status: ACTIVE | Noted: 2024-08-05

## 2024-11-09 ENCOUNTER — TELEPHONE (OUTPATIENT)
Dept: FAMILY MEDICINE CLINIC | Facility: CLINIC | Age: 39
End: 2024-11-09

## 2024-11-09 NOTE — TELEPHONE ENCOUNTER
atorvastatin 10 MG Oral Tab     Patient saw dr mcwilliams on 11/6/24,  Patient states he told Dr Mcwilliams that his Cardiologist Dr Juan Villaseñor did not want him on Cholesterol medication due to his fatty liver.  Patient did not  the medication up from the pharmacy.  Patient states his cardiologist recommended him to control his fatty liver with diet     Also Patient would like a call when the Mounjaro has been approved

## 2024-11-11 ENCOUNTER — TELEPHONE (OUTPATIENT)
Dept: FAMILY MEDICINE CLINIC | Facility: CLINIC | Age: 39
End: 2024-11-11

## 2024-11-11 DIAGNOSIS — E11.65 UNCONTROLLED TYPE 2 DIABETES MELLITUS WITH HYPERGLYCEMIA, WITHOUT LONG-TERM CURRENT USE OF INSULIN (HCC): ICD-10-CM

## 2024-11-15 RX ORDER — TIRZEPATIDE 2.5 MG/.5ML
2.5 INJECTION, SOLUTION SUBCUTANEOUS WEEKLY
Qty: 2 ML | Refills: 0 | Status: SHIPPED | OUTPATIENT
Start: 2024-11-15

## 2024-11-15 NOTE — TELEPHONE ENCOUNTER
Received fax from [employer] prescription that prior authorization approved from 11/14/24-11/14/25  Copy sent to scan  Medication resent to pharmacy

## 2024-12-09 RX ORDER — AMLODIPINE BESYLATE 2.5 MG/1
2.5 TABLET ORAL 2 TIMES DAILY
Qty: 180 TABLET | Refills: 0 | Status: SHIPPED | OUTPATIENT
Start: 2024-12-09

## 2024-12-09 NOTE — TELEPHONE ENCOUNTER
Hypertension Medications Protocol Passed        iabetes Medication Protocol [id number]/09/2024 09:14 AM   Protocol Details Last A1C < 7.5 and within past 6 months    In person appointment or virtual visit in the past 6 mos or appointment in next 3 mos    Microalbumin procedure in past 12 months or taking ACE/ARB    EGFRCR or GFRNAA > 50    GFR in the past 12 months   Last office visit 11/6/24  Amlodipine Last refilled on 8/5/24 for # 180 with 1 refills  Metformin 8/5/24 #180 1 refill  [facility]  No future appointments.     AMLODIPINE BESYLATE  2.5 MG TABS 07/31/2024 07/29/2024  180 tablet  90 Rosa Maria Curiel, APRAMARI     METFORMIN HYDROCHLORIDE  1000 MG TABS 08/05/2024 08/05/2024  180 tablet  90 Rosa Maria Curiel, APRN

## 2024-12-19 ENCOUNTER — HOSPITAL ENCOUNTER (OUTPATIENT)
Facility: HOSPITAL | Age: 39
Setting detail: OBSERVATION
Discharge: HOME | End: 2024-12-21
Attending: EMERGENCY MEDICINE | Admitting: HOSPITALIST
Payer: COMMERCIAL

## 2024-12-19 ENCOUNTER — HOSPITAL ENCOUNTER (INPATIENT)
Facility: HOSPITAL | Age: 39
LOS: 2 days | Discharge: HOME | End: 2024-12-21
Attending: EMERGENCY MEDICINE | Admitting: HOSPITALIST
Payer: COMMERCIAL

## 2024-12-19 DIAGNOSIS — K52.9 GASTROENTERITIS: Primary | ICD-10-CM

## 2024-12-19 DIAGNOSIS — N17.9 ACUTE KIDNEY INJURY (HCC): ICD-10-CM

## 2024-12-19 PROBLEM — E87.20 METABOLIC ACIDOSIS: Status: ACTIVE | Noted: 2024-12-19

## 2024-12-19 PROBLEM — E87.1 HYPONATREMIA: Status: ACTIVE | Noted: 2024-12-19

## 2024-12-19 PROBLEM — E87.6 HYPOKALEMIA: Status: ACTIVE | Noted: 2024-12-19

## 2024-12-19 PROBLEM — D72.829 LEUKOCYTOSIS: Status: ACTIVE | Noted: 2024-12-19

## 2024-12-19 LAB
ADENOVIRUS F 40/41 PCR: NEGATIVE
ALBUMIN SERPL-MCNC: 4.9 G/DL (ref 3.2–4.8)
ALBUMIN/GLOB SERPL: 1.6 {RATIO} (ref 1–2)
ALP LIVER SERPL-CCNC: 86 U/L
ALT SERPL-CCNC: 26 U/L
ANION GAP SERPL CALC-SCNC: 16 MMOL/L (ref 0–18)
ANION GAP SERPL CALC-SCNC: 17 MMOL/L (ref 0–18)
AST SERPL-CCNC: 13 U/L (ref ?–34)
ASTROVIRUS PCR: NEGATIVE
BASOPHILS # BLD: 0 X10(3) UL (ref 0–0.2)
BASOPHILS NFR BLD: 0 %
BILIRUB SERPL-MCNC: 0.8 MG/DL (ref 0.3–1.2)
BILIRUB UR QL CFM: NEGATIVE
BUN BLD-MCNC: 20 MG/DL (ref 9–23)
BUN BLD-MCNC: 27 MG/DL (ref 9–23)
C CAYETANENSIS DNA SPEC QL NAA+PROBE: NEGATIVE
C DIFF TOX B STL QL: NEGATIVE
CALCIUM BLD-MCNC: 8.7 MG/DL (ref 8.7–10.4)
CALCIUM BLD-MCNC: 9.8 MG/DL (ref 8.7–10.4)
CAMPY SP DNA.DIARRHEA STL QL NAA+PROBE: NEGATIVE
CHLORIDE SERPL-SCNC: 96 MMOL/L (ref 98–112)
CHLORIDE SERPL-SCNC: 98 MMOL/L (ref 98–112)
CLARITY UR REFRACT.AUTO: CLEAR
CO2 SERPL-SCNC: 15 MMOL/L (ref 21–32)
CO2 SERPL-SCNC: 15 MMOL/L (ref 21–32)
COLOR UR AUTO: YELLOW
CREAT BLD-MCNC: 2.1 MG/DL
CREAT BLD-MCNC: 3.22 MG/DL
CRYPTOSP DNA SPEC QL NAA+PROBE: NEGATIVE
EAEC PAA PLAS AGGR+AATA ST NAA+NON-PRB: NEGATIVE
EC STX1+STX2 + H7 FLIC SPEC NAA+PROBE: NEGATIVE
EGFRCR SERPLBLD CKD-EPI 2021: 24 ML/MIN/1.73M2 (ref 60–?)
EGFRCR SERPLBLD CKD-EPI 2021: 41 ML/MIN/1.73M2 (ref 60–?)
ENTAMOEBA HISTOLYTICA PCR: NEGATIVE
EOSINOPHIL # BLD: 0.21 X10(3) UL (ref 0–0.7)
EOSINOPHIL NFR BLD: 1 %
EPEC EAE GENE STL QL NAA+NON-PROBE: NEGATIVE
ERYTHROCYTE [DISTWIDTH] IN BLOOD BY AUTOMATED COUNT: 13 %
ETEC LTA+ST1A+ST1B TOX ST NAA+NON-PROBE: NEGATIVE
GIARDIA LAMBLIA PCR: NEGATIVE
GLOBULIN PLAS-MCNC: 3 G/DL (ref 2–3.5)
GLUCOSE BLD-MCNC: 154 MG/DL (ref 70–99)
GLUCOSE BLD-MCNC: 154 MG/DL (ref 70–99)
GLUCOSE BLD-MCNC: 159 MG/DL (ref 70–99)
GLUCOSE BLD-MCNC: 169 MG/DL (ref 70–99)
GLUCOSE BLD-MCNC: 217 MG/DL (ref 70–99)
GLUCOSE UR STRIP.AUTO-MCNC: NORMAL MG/DL
HCT VFR BLD AUTO: 56.7 %
HGB BLD-MCNC: 21.2 G/DL
HYALINE CASTS #/AREA URNS AUTO: PRESENT /LPF
KETONES UR STRIP.AUTO-MCNC: 40 MG/DL
LEUKOCYTE ESTERASE UR QL STRIP.AUTO: NEGATIVE
LIPASE SERPL-CCNC: 28 U/L (ref 12–53)
LYMPHOCYTES NFR BLD: 31 %
LYMPHOCYTES NFR BLD: 6.63 X10(3) UL (ref 1–4)
MCH RBC QN AUTO: 31.8 PG (ref 26–34)
MCHC RBC AUTO-ENTMCNC: 37.4 G/DL (ref 31–37)
MCV RBC AUTO: 85 FL
MONOCYTES # BLD: 1.5 X10(3) UL (ref 0.1–1)
MONOCYTES NFR BLD: 7 %
NEUTROPHILS # BLD AUTO: 12.27 X10 (3) UL (ref 1.5–7.7)
NEUTROPHILS NFR BLD: 50 %
NEUTS BAND NFR BLD: 11 %
NEUTS HYPERSEG # BLD: 13.05 X10(3) UL (ref 1.5–7.7)
NITRITE UR QL STRIP.AUTO: NEGATIVE
NOROVIRUS GI/GII PCR: NEGATIVE
OSMOLALITY SERPL CALC.SUM OF ELEC: 276 MOSM/KG (ref 275–295)
OSMOLALITY SERPL CALC.SUM OF ELEC: 276 MOSM/KG (ref 275–295)
OSMOLALITY UR: 605 MOSM/KG (ref 300–1300)
P SHIGELLOIDES DNA STL QL NAA+PROBE: NEGATIVE
PH UR STRIP.AUTO: 5.5 [PH] (ref 5–8)
PLATELET # BLD AUTO: 380 10(3)UL (ref 150–450)
POCT INFLUENZA A: NEGATIVE
POCT INFLUENZA B: NEGATIVE
POTASSIUM SERPL-SCNC: 3.3 MMOL/L (ref 3.5–5.1)
POTASSIUM SERPL-SCNC: 3.6 MMOL/L (ref 3.5–5.1)
PROT SERPL-MCNC: 7.9 G/DL (ref 5.7–8.2)
PROT UR STRIP.AUTO-MCNC: 30 MG/DL
RBC # BLD AUTO: 6.67 X10(6)UL
RBC UR QL AUTO: NEGATIVE
ROTAVIRUS A PCR: NEGATIVE
SALMONELLA DNA SPEC QL NAA+PROBE: NEGATIVE
SAPOVIRUS PCR: NEGATIVE
SHIGELLA SP+EIEC IPAH ST NAA+NON-PROBE: NEGATIVE
SODIUM SERPL-SCNC: 127 MMOL/L (ref 136–145)
SODIUM SERPL-SCNC: 130 MMOL/L (ref 136–145)
SODIUM SERPL-SCNC: 15 MMOL/L
SP GR UR STRIP.AUTO: 1.02 (ref 1–1.03)
TOTAL CELLS COUNTED BLD: 100
UROBILINOGEN UR STRIP.AUTO-MCNC: NORMAL MG/DL
V CHOLERAE DNA SPEC QL NAA+PROBE: NEGATIVE
VIBRIO DNA SPEC NAA+PROBE: NEGATIVE
WBC # BLD AUTO: 21.4 X10(3) UL (ref 4–11)
YERSINIA DNA SPEC NAA+PROBE: NEGATIVE

## 2024-12-19 PROCEDURE — 99223 1ST HOSP IP/OBS HIGH 75: CPT | Performed by: INTERNAL MEDICINE

## 2024-12-19 RX ORDER — POLYETHYLENE GLYCOL 3350 17 G/17G
17 POWDER, FOR SOLUTION ORAL DAILY PRN
Status: DISCONTINUED | OUTPATIENT
Start: 2024-12-19 | End: 2024-12-21

## 2024-12-19 RX ORDER — ONDANSETRON 2 MG/ML
4 INJECTION INTRAMUSCULAR; INTRAVENOUS ONCE
Status: COMPLETED | OUTPATIENT
Start: 2024-12-19 | End: 2024-12-19

## 2024-12-19 RX ORDER — ACETAMINOPHEN 500 MG
1000 TABLET ORAL EVERY 8 HOURS PRN
Status: DISCONTINUED | OUTPATIENT
Start: 2024-12-19 | End: 2024-12-21

## 2024-12-19 RX ORDER — SODIUM CHLORIDE 9 MG/ML
INJECTION, SOLUTION INTRAVENOUS CONTINUOUS
Status: DISCONTINUED | OUTPATIENT
Start: 2024-12-19 | End: 2024-12-19

## 2024-12-19 RX ORDER — ONDANSETRON 2 MG/ML
4 INJECTION INTRAMUSCULAR; INTRAVENOUS EVERY 6 HOURS PRN
Status: DISCONTINUED | OUTPATIENT
Start: 2024-12-19 | End: 2024-12-21

## 2024-12-19 RX ORDER — DEXTROSE MONOHYDRATE 25 G/50ML
50 INJECTION, SOLUTION INTRAVENOUS
Status: DISCONTINUED | OUTPATIENT
Start: 2024-12-19 | End: 2024-12-21

## 2024-12-19 RX ORDER — BISACODYL 10 MG
10 SUPPOSITORY, RECTAL RECTAL
Status: DISCONTINUED | OUTPATIENT
Start: 2024-12-19 | End: 2024-12-21

## 2024-12-19 RX ORDER — NICOTINE POLACRILEX 4 MG
30 LOZENGE BUCCAL
Status: DISCONTINUED | OUTPATIENT
Start: 2024-12-19 | End: 2024-12-21

## 2024-12-19 RX ORDER — BENZONATATE 200 MG/1
200 CAPSULE ORAL 3 TIMES DAILY PRN
Status: DISCONTINUED | OUTPATIENT
Start: 2024-12-19 | End: 2024-12-21

## 2024-12-19 RX ORDER — SENNOSIDES 8.6 MG
17.2 TABLET ORAL NIGHTLY PRN
Status: DISCONTINUED | OUTPATIENT
Start: 2024-12-19 | End: 2024-12-21

## 2024-12-19 RX ORDER — ECHINACEA PURPUREA EXTRACT 125 MG
1 TABLET ORAL
Status: DISCONTINUED | OUTPATIENT
Start: 2024-12-19 | End: 2024-12-21

## 2024-12-19 RX ORDER — HEPARIN SODIUM 5000 [USP'U]/ML
5000 INJECTION, SOLUTION INTRAVENOUS; SUBCUTANEOUS EVERY 8 HOURS SCHEDULED
Status: DISCONTINUED | OUTPATIENT
Start: 2024-12-19 | End: 2024-12-21

## 2024-12-19 RX ORDER — NICOTINE POLACRILEX 4 MG
15 LOZENGE BUCCAL
Status: DISCONTINUED | OUTPATIENT
Start: 2024-12-19 | End: 2024-12-21

## 2024-12-19 RX ORDER — PROCHLORPERAZINE EDISYLATE 5 MG/ML
5 INJECTION INTRAMUSCULAR; INTRAVENOUS EVERY 8 HOURS PRN
Status: DISCONTINUED | OUTPATIENT
Start: 2024-12-19 | End: 2024-12-21

## 2024-12-19 RX ORDER — SODIUM PHOSPHATE, DIBASIC AND SODIUM PHOSPHATE, MONOBASIC 7; 19 G/230ML; G/230ML
1 ENEMA RECTAL ONCE AS NEEDED
Status: DISCONTINUED | OUTPATIENT
Start: 2024-12-19 | End: 2024-12-21

## 2024-12-19 RX ORDER — SODIUM CHLORIDE, SODIUM LACTATE, POTASSIUM CHLORIDE, CALCIUM CHLORIDE 600; 310; 30; 20 MG/100ML; MG/100ML; MG/100ML; MG/100ML
INJECTION, SOLUTION INTRAVENOUS CONTINUOUS
Status: DISCONTINUED | OUTPATIENT
Start: 2024-12-19 | End: 2024-12-21

## 2024-12-19 NOTE — PROGRESS NOTES
12/19/24 1327   BiPAP   $ RT Standby Charge (per 15 min) 1   $ ARNIE Follow up charge Yes   BiPAP/CPAP Monitored Parameters   Toleration Refused     Patient refused

## 2024-12-19 NOTE — ED QUICK NOTES
Orders for admission, patient is aware of plan and ready to go upstairs. Any questions, please call ED RN wilman  at extension [phone].     Vaccinated?unk  Type of COVID test sent:n/a  COVID Suspicion level: Low      Titratable drug(s) infusing:n/a  Rate:n/a    LOC at time of transport:aaox4    Other pertinent information:wife driving to the hospital. IV covered for transport. Unable to give urine specimen during ED stay.    CIWA score=  NIH score=

## 2024-12-19 NOTE — PLAN OF CARE
Assumed care of patient from [facility].   Reports of diarrhea, sharp shooting pain, unable to keep anything down.   Orientated to room.   C-dif pending     1330: unable to urinate yet.     1710: has urinated twice. Second occurrence pt reminded to use urinal too track outpt.     Report given to Cierra in [facility]      Problem: Diabetes/Glucose Control  Goal: Glucose maintained within prescribed range  Description: INTERVENTIONS:  - Monitor Blood Glucose as ordered  - Assess for signs and symptoms of hyperglycemia and hypoglycemia  - Administer ordered medications to maintain glucose within target range  - Assess barriers to adequate nutritional intake and initiate nutrition consult as needed  - Instruct patient on self management of diabetes  Outcome: Progressing     Problem: Patient/Family Goals  Goal: Patient/Family Long Term Goal  Description: Patient's Long Term Goal:     Interventions:  -   - See additional Care Plan goals for specific interventions  Outcome: Progressing  Goal: Patient/Family Short Term Goal  Description: Patient's Short Term Goal:     Interventions:   -   - See additional Care Plan goals for specific interventions  Outcome: Progressing

## 2024-12-19 NOTE — H&P
[facility]     Jak Esquivel Patient Status:  Inpatient    1985 MRN [id number]   [facility] 0SW-A Attending Valentín Alamo*   Hosp Day # 0 PCP Tonia Gudino MD     Chief Complaint: N/V/D    Subjective:    History of Present Illness:     Jak Esquivel is a 39 year old male with past medical history of hypertension, diabetes presented to the emergency department with nausea vomiting diarrhea    Patient was started on Mounjaro 3 weeks ago.  He is taking a starting dose of 2.5mg   His first and second dose were uncomplicated, he took his third dose this past  morning as usual.  He had been feeling fine prior to this.  He developed nausea and emesis with profuse watery diarrhea.  It has been progressive and worsening since then.  The last 2 days he has not been able to eat anything and thus has not taken his antihypertensives fortunately.  He has some vague abdominal discomfort as well.  He denies any sick contacts.      History/Other:    Past Medical History:  Past Medical History:    Diabetes (HCC)    High blood pressure    HTN (hypertension)    LVH (left ventricular hypertrophy)    Sleep apnea    no device yet at time of screen     Visual impairment    glasses     Past Surgical History:   Past Surgical History:   Procedure Laterality Date    Appendectomy      Colonoscopy N/A 2024    Procedure: COLONOSCOPY with cold snare polypectomy;  Surgeon: Ilya Villegas MD;  Location:  ENDOSCOPY      Family History:   Family History   Problem Relation Age of Onset    Breast Cancer Mother     Heart Attack Mother     Heart Disease Mother     Colon Cancer Sister      Social History:    reports that he has never smoked. He has never used smokeless tobacco. He reports current alcohol use. He reports that he does not use drugs.     Allergies: Allergies[1]    Medications:  Medications Ordered Prior to Encounter[2]    Review of Systems:   A  comprehensive review of systems was completed.    Pertinent positives and negatives noted in the HPI.    Objective:   Physical Exam:    /82 (BP Location: Right arm)   Pulse 93   Temp 98.1 °F (36.7 °C) (Oral)   Resp 16   Ht 5' 11\" (1.803 m)   Wt 284 lb (128.8 kg)   SpO2 97%   BMI 39.61 kg/m²   General: No acute distress, Alert  Respiratory: No rhonchi, no wheezes  Cardiovascular: S1, S2. Regular rate and rhythm  Abdomen: Soft, Non-tender, non-distended, positive bowel sounds  Neuro: No new focal deficits  Extremities: No edema    Results:    Labs:      Labs Last 24 Hours:    Recent Labs   Lab 12/19/24  0750   RBC 6.67*   HGB 21.2*   HCT 56.7*   MCV 85.0   MCH 31.8   MCHC 37.4*   RDW 13.0   NEPRELIM 12.27*   WBC 21.4*   .0       Recent Labs   Lab 12/19/24  0750   *   BUN 27*   CREATSERUM 3.22*   EGFRCR 24*   CA 9.8   ALB 4.9*   *   K 3.3*   CL 96*   CO2 15.0*   ALKPHO 86   AST 13   ALT 26   BILT 0.8   TP 7.9       No results found for: \"PT\", \"INR\"    No results for input(s): \"TROP\", \"TROPHS\", \"CK\" in the last 168 hours.    No results for input(s): \"TROP\", \"PBNP\" in the last 168 hours.    No results for input(s): \"PCT\" in the last 168 hours.    Imaging: Imaging data reviewed in Epic.    Assessment & Plan:      #Hyponatremia, Dehydration, Acute Kidney Injury 2/2 gastroenteritis in the setting of GLP-1, diuretic use  -IVF  -hold hydrochlorothiazide  -repeat BMP today   -CLD and ADAT    #NAGMA due to gastrointestinal loss  -monitor with IVF  -GI Stool PCR, Cdiff    #Polycythemia likely due to above  -repeat after hydration     #HTN  -hold antihypertensives     #GERD  -PPI    #Morbid Obesity  Body mass index is 39.61 kg/m².  -hold GLP-1    #ARNIE  -CPAP        Plan of care discussed with patient    Tre Goldstein DO    Supplementary Documentation:     The 21st Century Cures Act makes medical notes like these available to patients in the interest of transparency. Please be advised this is a  medical document. Medical documents are intended to carry relevant information, facts as evident, and the clinical opinion of the practitioner. The medical note is intended as peer to peer communication and may appear blunt or direct. It is written in medical language and may contain abbreviations or verbiage that are unfamiliar.                                       [1] No Known Allergies  [2]   No current facility-administered medications on file prior to encounter.     Current Outpatient Medications on File Prior to Encounter   Medication Sig Dispense Refill    metFORMIN HCl 1000 MG Oral Tab Take 1 tablet (1,000 mg total) by mouth 2 (two) times daily with meals. 180 tablet 0    amLODIPine 2.5 MG Oral Tab Take 1 tablet (2.5 mg total) by mouth in the morning and 1 tablet (2.5 mg total) before bedtime. 180 tablet 0    Tirzepatide (MOUNJARO) 2.5 MG/0.5ML Subcutaneous Solution Auto-injector Inject 2.5 mg into the skin once a week. 2 mL 0    lisinopril 20 MG Oral Tab Take 1 tablet (20 mg total) by mouth in the morning and 1 tablet (20 mg total) before bedtime.      hydroCHLOROthiazide 25 MG Oral Tab Take 1 tablet (25 mg total) by mouth daily. 90 tablet 1    omeprazole 20 MG Oral Capsule Delayed Release Take 1 capsule (20 mg total) by mouth every morning before breakfast.      ACCU-CHEK GUIDE In Vitro Strip USE ONE NEW TEST STRIP EACH TIME TO TEST BLOOD SUGARS IN THE MORNING, AT NOON AND AT BEDTIME 100 strip 2    Blood Glucose Monitoring Suppl (ACCU-CHEK GUIDE ME) w/Device Does not apply Kit 1 each daily. 1 kit 0    Accu-Chek FastClix Lancets Does not apply Misc Test three times daily. 300 each 0

## 2024-12-19 NOTE — ED INITIAL ASSESSMENT (HPI)
Vomiting and diarrhea started on Sunday, started Monjaro 3 weeks ago, no sick contacts, given Zofran from MD yesterday, denies cough and fever

## 2024-12-19 NOTE — ED PROVIDER NOTES
Patient Seen in: [facility] [place]      History     Chief Complaint   Patient presents with    Nausea/Vomiting/Diarrhea     Stated Complaint: vomiting/diarrhea    Subjective:   HPI      39-year-old  male complaint of vomiting diarrhea that started about 4 days ago and the patient has been taking Mounjaro for about 3 weeks he has some mild crampy periumbilical abdominal pain no blood in the vomit or in the stool.  No fever chills no recent antibiotics.  He denies excessive alcohol or marijuana.    Objective:     Past Medical History:    Diabetes (HCC)    High blood pressure    HTN (hypertension)    LVH (left ventricular hypertrophy)    Sleep apnea    no device yet at time of screen 1/24    Visual impairment    glasses              Past Surgical History:   Procedure Laterality Date    Appendectomy  2000    Colonoscopy N/A 2/12/2024    Procedure: COLONOSCOPY with cold snare polypectomy;  Surgeon: Ilya Villegas MD;  Location:  ENDOSCOPY                Social History     Socioeconomic History    Marital status: Single   Tobacco Use    Smoking status: Never    Smokeless tobacco: Never   Vaping Use    Vaping status: Never Used   Substance and Sexual Activity    Alcohol use: Yes     Comment: social-2  drinks a month    Drug use: Never     Social Drivers of Health     Food Insecurity: No Food Insecurity (12/19/2024)    Food Insecurity     Food Insecurity: Never true   Transportation Needs: No Transportation Needs (12/19/2024)    Transportation Needs     Lack of Transportation: No   Housing Stability: Low Risk  (12/19/2024)    Housing Stability     Housing Instability: No                  Physical Exam     ED Triage Vitals [12/19/24 0742]   BP 92/78   Pulse (!) 122   Resp 18   Temp 97 °F (36.1 °C)   Temp src Temporal   SpO2 98 %   O2 Device None (Room air)       Current Vitals:   Vital Signs  BP: 123/82  Pulse: 93  Resp: 16  Temp: 98.1 °F (36.7 °C)  Temp src: Oral  MAP (mmHg): 92    Oxygen  Therapy  SpO2: 97 %  O2 Device: None (Room air)        Physical Exam  The patient is alert orient x 3 no acute distress HEENT exam within normal limits neck there is no lymphadenopathy or JVD lungs are clear cardiovascular exam shows regular rate and rhythm without murmurs abdomen soft and nontender extremities normal skin is no rash.    ED Course     Labs Reviewed   COMP METABOLIC PANEL (14) - Abnormal; Notable for the following components:       Result Value    Glucose 217 (*)     Sodium 127 (*)     Potassium 3.3 (*)     Chloride 96 (*)     CO2 15.0 (*)     BUN 27 (*)     Creatinine 3.22 (*)     eGFR-Cr 24 (*)     Albumin 4.9 (*)     All other components within normal limits   CBC WITH DIFFERENTIAL WITH PLATELET - Abnormal; Notable for the following components:    WBC 21.4 (*)     RBC 6.67 (*)     HGB 21.2 (*)     HCT 56.7 (*)     MCHC 37.4 (*)     Neutrophil Absolute Prelim 12.27 (*)     All other components within normal limits   MANUAL DIFFERENTIAL - Abnormal; Notable for the following components:    Neutrophil Absolute Manual 13.05 (*)     Lymphocyte Absolute Manual 6.63 (*)     Monocyte Absolute Manual 1.50 (*)     RBC Morphology See morphology below (*)     Platelet Morphology See morphology below (*)     Giant platelets Few (*)     All other components within normal limits   BASIC METABOLIC PANEL (8) - Abnormal; Notable for the following components:    Glucose 154 (*)     Sodium 130 (*)     CO2 15.0 (*)     Creatinine 2.10 (*)     eGFR-Cr 41 (*)     All other components within normal limits   POCT GLUCOSE - Abnormal; Notable for the following components:    POC Glucose 154 (*)     All other components within normal limits   LIPASE - Normal   C. DIFFICILE(TOXIGENIC)PCR - Normal   POCT FLU TEST - Normal    Narrative:     This assay is a rapid molecular in vitro test utilizing nucleic acid amplification of influenza A and B viral RNA.   SCAN SLIDE   PATH COMMENT CBC   SODIUM, URINE, RANDOM   OSMOLALITY, URINE    URINALYSIS WITH CULTURE REFLEX   GI STOOL PANEL BY PCR            Abnormal Labs Reviewed   COMP METABOLIC PANEL (14) - Abnormal; Notable for the following components:       Result Value    Glucose 217 (*)     Sodium 127 (*)     Potassium 3.3 (*)     Chloride 96 (*)     CO2 15.0 (*)     BUN 27 (*)     Creatinine 3.22 (*)     eGFR-Cr 24 (*)     Albumin 4.9 (*)     All other components within normal limits   CBC WITH DIFFERENTIAL WITH PLATELET - Abnormal; Notable for the following components:    WBC 21.4 (*)     RBC 6.67 (*)     HGB 21.2 (*)     HCT 56.7 (*)     MCHC 37.4 (*)     Neutrophil Absolute Prelim 12.27 (*)     All other components within normal limits   MANUAL DIFFERENTIAL - Abnormal; Notable for the following components:    Neutrophil Absolute Manual 13.05 (*)     Lymphocyte Absolute Manual 6.63 (*)     Monocyte Absolute Manual 1.50 (*)     RBC Morphology See morphology below (*)     Platelet Morphology See morphology below (*)     Giant platelets Few (*)     All other components within normal limits   BASIC METABOLIC PANEL (8) - Abnormal; Notable for the following components:    Glucose 154 (*)     Sodium 130 (*)     CO2 15.0 (*)     Creatinine 2.10 (*)     eGFR-Cr 41 (*)     All other components within normal limits   POCT GLUCOSE - Abnormal; Notable for the following components:    POC Glucose 154 (*)     All other components within normal limits     Labs showed creatinine 3.22 BUN 27 white this count 21 CO2 15 potassium 3.3 sodium 127       MDM      Initial differential diagnosis considered but not limited to includes side effects of Mounjaro, gastroenteritis, dehydration,    Admission disposition: 12/19/2024  9:44 AM           Medical Decision Making    Patient has dehydration possibly secondary to gastroenteritis versus medication and acute renal failure the patient was given IV fluids and admitted.  Disposition and Plan     Clinical Impression:  1. Gastroenteritis    2. Acute kidney injury (HCC)          Disposition:  Admit  12/19/2024  9:44 am    Follow-up:  No follow-up provider specified.        Medications Prescribed:  Current Discharge Medication List              Supplementary Documentation:         Hospital Problems       Present on Admission  Date Reviewed: 11/7/2024            ICD-10-CM Noted POA    * (Principal) Gastroenteritis K52.9 12/19/2024 Unknown    Acute kidney injury (HCC) N17.9 12/19/2024 Yes    Hypokalemia E87.6 12/19/2024 Yes    Hyponatremia E87.1 12/19/2024 Yes    Leukocytosis D72.829 12/19/2024 Yes    Metabolic acidosis E87.20 12/19/2024 Yes

## 2024-12-19 NOTE — ED QUICK NOTES
Pt being driven by his wife to [facility] for admission. IV covered. Pt denies any dizziness on discharge. Brought via wheelchair to his car

## 2024-12-20 PROBLEM — E11.65 TYPE 2 DIABETES MELLITUS WITH HYPERGLYCEMIA, WITHOUT LONG-TERM CURRENT USE OF INSULIN (HCC): Status: ACTIVE | Noted: 2024-12-20

## 2024-12-20 LAB
ANION GAP SERPL CALC-SCNC: 14 MMOL/L (ref 0–18)
BASOPHILS # BLD AUTO: 0.08 X10(3) UL (ref 0–0.2)
BASOPHILS NFR BLD AUTO: 0.5 %
BUN BLD-MCNC: 23 MG/DL (ref 9–23)
CALCIUM BLD-MCNC: 9.3 MG/DL (ref 8.7–10.4)
CHLORIDE SERPL-SCNC: 96 MMOL/L (ref 98–112)
CO2 SERPL-SCNC: 24 MMOL/L (ref 21–32)
CREAT BLD-MCNC: 1.74 MG/DL
EGFRCR SERPLBLD CKD-EPI 2021: 51 ML/MIN/1.73M2 (ref 60–?)
EOSINOPHIL # BLD AUTO: 1.2 X10(3) UL (ref 0–0.7)
EOSINOPHIL NFR BLD AUTO: 7.4 %
ERYTHROCYTE [DISTWIDTH] IN BLOOD BY AUTOMATED COUNT: 12.6 %
EST. AVERAGE GLUCOSE BLD GHB EST-MCNC: 223 MG/DL (ref 68–126)
GLUCOSE BLD-MCNC: 141 MG/DL (ref 70–99)
GLUCOSE BLD-MCNC: 145 MG/DL (ref 70–99)
GLUCOSE BLD-MCNC: 159 MG/DL (ref 70–99)
GLUCOSE BLD-MCNC: 160 MG/DL (ref 70–99)
GLUCOSE BLD-MCNC: 178 MG/DL (ref 70–99)
HBA1C MFR BLD: 9.4 % (ref ?–5.7)
HCT VFR BLD AUTO: 52.1 %
HGB BLD-MCNC: 19 G/DL
IMM GRANULOCYTES # BLD AUTO: 0.23 X10(3) UL (ref 0–1)
IMM GRANULOCYTES NFR BLD: 1.4 %
LYMPHOCYTES # BLD AUTO: 4.85 X10(3) UL (ref 1–4)
LYMPHOCYTES NFR BLD AUTO: 29.9 %
MCH RBC QN AUTO: 31.2 PG (ref 26–34)
MCHC RBC AUTO-ENTMCNC: 36.5 G/DL (ref 31–37)
MCV RBC AUTO: 85.6 FL
MONOCYTES # BLD AUTO: 1.14 X10(3) UL (ref 0.1–1)
MONOCYTES NFR BLD AUTO: 7 %
NEUTROPHILS # BLD AUTO: 8.74 X10 (3) UL (ref 1.5–7.7)
NEUTROPHILS # BLD AUTO: 8.74 X10(3) UL (ref 1.5–7.7)
NEUTROPHILS NFR BLD AUTO: 53.8 %
OSMOLALITY SERPL CALC.SUM OF ELEC: 285 MOSM/KG (ref 275–295)
PLATELET # BLD AUTO: 238 10(3)UL (ref 150–450)
POTASSIUM SERPL-SCNC: 3.6 MMOL/L (ref 3.5–5.1)
RBC # BLD AUTO: 6.09 X10(6)UL
SODIUM SERPL-SCNC: 134 MMOL/L (ref 136–145)
WBC # BLD AUTO: 16.2 X10(3) UL (ref 4–11)

## 2024-12-20 PROCEDURE — 99232 SBSQ HOSP IP/OBS MODERATE 35: CPT | Performed by: STUDENT IN AN ORGANIZED HEALTH CARE EDUCATION/TRAINING PROGRAM

## 2024-12-20 PROCEDURE — 99221 1ST HOSP IP/OBS SF/LOW 40: CPT | Performed by: CLINICAL NURSE SPECIALIST

## 2024-12-20 RX ORDER — DICYCLOMINE HYDROCHLORIDE 10 MG/1
10 CAPSULE ORAL
Status: DISCONTINUED | OUTPATIENT
Start: 2024-12-20 | End: 2024-12-21

## 2024-12-20 RX ORDER — SIMETHICONE 80 MG
160 TABLET,CHEWABLE ORAL 3 TIMES DAILY
Status: DISCONTINUED | OUTPATIENT
Start: 2024-12-20 | End: 2024-12-21

## 2024-12-20 RX ORDER — SIMETHICONE 80 MG
80 TABLET,CHEWABLE ORAL 3 TIMES DAILY
Status: DISCONTINUED | OUTPATIENT
Start: 2024-12-20 | End: 2024-12-21

## 2024-12-20 RX ORDER — INSULIN DEGLUDEC 100 U/ML
25 INJECTION, SOLUTION SUBCUTANEOUS DAILY
Status: DISCONTINUED | OUTPATIENT
Start: 2024-12-20 | End: 2024-12-21

## 2024-12-20 NOTE — DISCHARGE INSTRUCTIONS
Follow up for Diabetes care:    It is recommended that you follow up with an outpatient diabetes center.   Please obtain a referral from your primary care doctor if you do not already have one.   Below is the location and number of the [facility] nearest you.     [place] location:  [address] [place] [address] [address]  [phone]    [place] location:  [address]  [phone]    [place] location[facility]  [address]   [phone]    [place] location:  [address] [facility]  [phone]    [place] location:  [address]  [phone]    [place] location:  [address]  [phone]

## 2024-12-20 NOTE — PAYOR COMM NOTE
--------------  ADMISSION REVIEW     Payor: [employer]/EPO  Subscriber #:  [id number]  Authorization Number: [id number]    Admit date: 12/19/24  Admit time: 11:05 AM       ED Provider Notes      Patient Seen in: [facility]    History   Stated Complaint: vomiting/diarrhea  HPI  39-year-old  male complaint of vomiting diarrhea that started about 4 days ago and the patient has been taking Mounjaro for about 3 weeks he has some mild crampy periumbilical abdominal pain no blood in the vomit or in the stool.  No fever chills no recent antibiotics.      ED Triage Vitals [12/19/24 0742]   BP 92/78   Pulse (!) 122   Resp 18   Temp 97 °F (36.1 °C)   Temp src Temporal   SpO2 98 %   O2 Device None (Room air)       Labs Reviewed   COMP METABOLIC PANEL (14) - Abnormal; Notable for the following components:       Result Value    Glucose 217 (*)     Sodium 127 (*)     Potassium 3.3 (*)     Chloride 96 (*)     CO2 15.0 (*)     BUN 27 (*)     Creatinine 3.22 (*)     eGFR-Cr 24 (*)     Albumin 4.9 (*)     All other components within normal limits   CBC WITH DIFFERENTIAL WITH PLATELET - Abnormal; Notable for the following components:    WBC 21.4 (*)     RBC 6.67 (*)     HGB 21.2 (*)     HCT 56.7 (*)     MCHC 37.4 (*)     Neutrophil Absolute Prelim 12.27 (*)     All other components within normal limits   MANUAL DIFFERENTIAL - Abnormal; Notable for the following components:    Neutrophil Absolute Manual 13.05 (*)     Lymphocyte Absolute Manual 6.63 (*)     Monocyte Absolute Manual 1.50 (*)     RBC Morphology See morphology below (*)     Platelet Morphology See morphology below (*)     Giant platelets Few (*)     All other components within normal limits   BASIC METABOLIC PANEL (8) - Abnormal; Notable for the following components:    Glucose 154 (*)     Sodium 130 (*)     CO2 15.0 (*)     Creatinine 2.10 (*)     eGFR-Cr 41 (*)     All other components within normal limits   POCT GLUCOSE -  Abnormal; Notable for the following components:    POC Glucose 154 (*)    Admission disposition: 12/19/2024  9:44 AM    Disposition and Plan     Clinical Impression:  1. Gastroenteritis    2. Acute kidney injury (HCC)       Disposition:  Admit  12/19/2024  9:44 am         [facility]  History and Physical     Subjective:    History of Present Illness:   Jak Esquivel is a 39 year old male with past medical history of hypertension, diabetes presented to the emergency department with nausea vomiting diarrhea     Patient was started on Mounjaro 3 weeks ago.  He is taking a starting dose of 2.5mg   His first and second dose were uncomplicated, he took his third dose this past Sunday morning as usual.  He had been feeling fine prior to this.  He developed nausea and emesis with profuse watery diarrhea.  It has been progressive and worsening since then.  The last 2 days he has not been able to eat anything and thus has not taken his antihypertensives fortunately.  He has some vague abdominal discomfort as well.  He denies any sick contacts.       /82 (BP Location: Right arm)   Pulse 93   Temp 98.1 °F (36.7 °C) (Oral)   Resp 16   Ht 5' 11\" (1.803 m)   Wt 284 lb (128.8 kg)   SpO2 97%   BMI 39.61 kg/m²   General: Alert  Respiratory: No rhonchi, no wheezes  Cardiovascular: S1, S2. Regular rate and rhythm  Abdomen: Soft, Non-tender, non-distended, positive bowel sounds  Neuro: No new focal deficits  Extremities: No edema     Lab 12/19/24  0750   RBC 6.67*   HGB 21.2*   HCT 56.7*   MCV 85.0   MCH 31.8   MCHC 37.4*   RDW 13.0   NEPRELIM 12.27*   WBC 21.4*   .0      Lab 12/19/24  0750   *   BUN 27*   CREATSERUM 3.22*   EGFRCR 24*   CA 9.8   ALB 4.9*   *   K 3.3*   CL 96*   CO2 15.0*   ALKPHO 86   AST 13   ALT 26   BILT 0.8   TP 7.9       Assessment & Plan:       #Hyponatremia, Dehydration, Acute Kidney Injury 2/2 gastroenteritis in the setting of GLP-1, diuretic use  -IVF  -hold  hydrochlorothiazide  -repeat BMP today   -CLD and ADAT     #NAGMA due to gastrointestinal loss  -monitor with IVF  -GI Stool PCR, Cdiff    #Polycythemia likely due to above  -repeat after hydration      #HTN  -hold antihypertensives      #GERD  -PPI     #Morbid Obesity  Body mass index is 39.61 kg/m².  -hold GLP-1     #ARNIE  -CPAP             lactated ringers infusion  Rate: 125 mL/hr  Freq: Continuous Route: IV  Start: 12/19/24 1300       MEDICATIONS ADMINISTERED IN LAST 1 DAY:  dicyclomine (Bentyl) cap 10 mg       Date Action Dose Route User    12/20/2024 0941 Given 10 mg Oral Terese Valencia RN          heparin (Porcine) 5000 UNIT/ML injection 5,000 Units       Date Action Dose Route User    12/20/2024 1428 Given 5,000 Units Subcutaneous (Left Lower Abdomen) Terese Valencia RN    12/20/2024 0608 Given 5,000 Units Subcutaneous (Right Lower Abdomen) Alexandria Andres RN    12/19/2024 2153 Given 5,000 Units Subcutaneous (Left Lower Abdomen) Alexandria Andres RN          insulin aspart (NovoLOG) 100 Units/mL FlexPen 2-10 Units       Date Action Dose Route User    12/20/2024 0608 Given 2 Units Subcutaneous (Right Upper Arm) Alexandria Andres RN    12/19/2024 2153 Given 2 Units Subcutaneous (Left Upper Arm) Alexandria Andres RN    12/19/2024 1623 Given 3 Units Subcutaneous (Right Upper Arm) Keturah Waters RN          insulin aspart (NovoLOG) 100 Units/mL FlexPen 1-20 Units       Date Action Dose Route User    12/20/2024 1241 Given 2 Units Subcutaneous (Left Upper Arm) Terese Valencia RN          insulin degludec (Tresiba) 100 units/mL flextouch 25 Units       Date Action Dose Route User    12/20/2024 1241 Given 25 Units Subcutaneous (Left Upper Arm) Terese Valencia RN          lactated ringers infusion       Date Action Dose Route User    12/20/2024 0607 New Bag (none) Intravenous Alexandria Andres RN    12/19/2024 2153 New Bag (none) Intravenous Alexandria Andres RN    12/19/2024 1617 Rate/Dose Verify (none)  Intravenous Keturah Waters, RN          ondansetron (Zofran) 4 MG/2ML injection 4 mg       Date Action Dose Route User    12/19/2024 1959 Given 4 mg Intravenous Alexandria Andres RN          simethicone (Mylicon) chewable tab 80 mg       Date Action Dose Route User    12/20/2024 0942 Given 80 mg Oral Terese Valencia RN            Vitals (last day)       Date/Time Temp Pulse Resp BP SpO2 Weight O2 Device O2 Flow Rate (L/min) Who    12/20/24 1230 98 °F (36.7 °C) 86 19 119/83 94 % -- -- -- AC    12/20/24 0700 98.4 °F (36.9 °C) 72 19 123/82 94 % -- -- -- AC    12/20/24 0551 97.8 °F (36.6 °C) 73 19 116/82 95 % -- -- -- OG    12/20/24 0100 97.4 °F (36.3 °C) 78 20 128/74 95 % -- -- -- OG    12/19/24 1956 98.2 °F (36.8 °C) 97 20 118/86 93 % -- -- -- OG    12/19/24 1833 -- 90 16 130/84 94 % -- -- -- EVELIN    12/19/24 1825 98.2 °F (36.8 °C) 83 17 146/92 95 % -- None (Room air) -- TD    12/19/24 1617 -- 94 16 144/79 99 % -- None (Room air) -- MT    12/19/24 1113 98.1 °F (36.7 °C) 93 16 123/82 97 % -- None (Room air) -- MT    12/19/24 0913 -- 78 16 123/83 99 % -- None (Room air) -- RS    12/19/24 0832 -- 105 20 95/69 100 % -- None (Room air) -- RS    12/19/24 0742 97 °F (36.1 °C) 122 18 92/78 98 % 284 lb (128.8 kg) None (Room air) -- CM

## 2024-12-20 NOTE — CONSULTS
[facility]  Diabetes Consult Note    Jak Esquivel Patient Status:  Inpatient    1985 MRN [id number]   Location [facility] 0SW-A Attending James Rebolledo, *   Hosp Day # 1 PCP Tonia Gudino MD     Reason for Consult:   Management recommendations in setting of uncontrolled T2DM      Provider Requesting Consult:  Dr. James Rebolledo ([facility] hospitalist)      Diagnosis:  Patient Active Problem List   Diagnosis    Uncontrolled type 2 diabetes mellitus with hyperglycemia, without long-term current use of insulin (HCC)    Mixed hyperlipidemia    Primary hypertension    Elevated liver enzymes    Steatosis of liver    Hyponatremia    Leukocytosis    Acute kidney injury (HCC)    Metabolic acidosis    Gastroenteritis    Hypokalemia    Type 2 diabetes mellitus with hyperglycemia, without long-term current use of insulin (HCC)         Medical History:  Past Medical History:    Diabetes (HCC)    High blood pressure    HTN (hypertension)    LVH (left ventricular hypertrophy)    Sleep apnea    no device yet at time of screen     Visual impairment    glasses     Past Surgical History:   Procedure Laterality Date    Appendectomy      Colonoscopy N/A 2024    Procedure: COLONOSCOPY with cold snare polypectomy;  Surgeon: Ilya Villegas MD;  Location:  ENDOSCOPY     Family History   Problem Relation Age of Onset    Breast Cancer Mother     Heart Attack Mother     Heart Disease Mother     Colon Cancer Sister          Diabetes history:  Type:  T2DM  Onset: 38yo  Family history of DM: mother with T2DM      Allergies: Allergies[1]      Medications: Complete list reviewed. Active diabetes medications include degludec and aspart.      Labs:  Recent Labs   Lab 24  1148 24  1620 24  2145 24  0547   PGLU 154* 159* 169* 141*     Recent Labs     24  0750 24  1148 24  1314 24  1620 24  0547 24  0710   *  --  130*  --   --  134*   CL 96*  --  98   --   --  96*   CO2 15.0*  --  15.0*  --   --  24.0   BUN 27*  --  20  --   --  23   CREATSERUM 3.22*  --  2.10*  --   --  1.74*   PGLU  --    < >  --    < > 141*  --    CA 9.8  --  8.7  --   --  9.3   ALB 4.9*  --   --   --   --   --     < > = values in this interval not displayed.                    History of Present Illness: Jak Esquivel is a 39 year old male with a PMH of T2DM, HTN, obesity, LV hypertrophy and ARNIE admitted 12/19/2024 with complaints of vomiting and diarrhea with start of Mounjaro. ED evaluation revealed gastroenteritis, as well as hyperglycemia (A1C 9.4%, ).         Assessment/Recommendations:  Upon interview today, patient states he was diagnosed with T2DM two years ago after being told he had pre-diabetes for many years. Patient states he follows with a PCP, but has never seen an endocrinology provider. Patient states he was started on Mounjaro 2.5mg 3 weeks ago; states he had mild nausea/diarrhea with first two doses, but vomiting and diarrhea became severe with 12 hours after administration of third dose. Explained to patient that this may correlate with Mounjaro given timing and recommended discontinue use d/t severe gastritis. Explained to patient that, given elevated A1C, would need to initiate alternate medication, as well as recommended continued increased exercise and decreased carb intake. Explained to patient that with an elevated A1C would recommend continuing basal insulin at discharge. Explained dosing and administration of basal insulin and that, with diet and exercise changes, patient may be able to wean dose over time. Further, recommended patient establish with outpatient endocrinology provider for further T2DM management. Patient states understanding of and willingness to participate in plan of care.       Plan:  Inpatient recommendations -   Degludec = recommend initiating at 25u every day  May require upwards of ~32u every day based on weight sensitivity  Aspart  = recommend initiating at 1:30>140 & 1:10gm CHO  Discharge recommendations -   Restart metformin 1000mg BID  Start tresiba 25u every day  Needs to be tresiba to qualify for [employer] drug savings program      Nursing Recommendations:  RN to teach insulin administration with an insulin pen - Reinforce the need to remove 2 caps from the home insulin pen needle   Patient to administer subcutaneously insulin injection with insulin pen under nursing supervision once return demonstration has verified patient's skill      Prescription Recommendations:  Commercial -  [employer]  Insulin:   Novolog, Fiasp, Apidra, Admelog, Levemir, Lantus, Basaglar, Tresiba, Toujeo   Supplies:  BD pen needles ([place]); BD syringes  Glucometer:  OneTouch   CGM:  Dexcom G6; FreeStyle Osman       Provider F/U Recommendations:  PCP - Dr. Gudino  Endocrinology - BROOKE Bueno ([place])      A total of 60 minutes were spent with the patient, 100% was spent counseling and coordinating care for T2 diabetes self-management including nutrition, exercise, blood glucose monitoring, insulin administration, medications, treatment options, follow-up coordination and resources.    Sol Mclean, APRN  12/20/2024  11:15 AM         [1] No Known Allergies

## 2024-12-20 NOTE — PLAN OF CARE
Assumed care of pt at 1930, pt denies pain but does c/o nausea and medicated with relief. Pt was dry heaving at the beginning of the shift and his HR went up to 150 from the 90's but did not sustain. Pt tolerating clears. No emesis. Remains with loose stools GI Panel negative, CDif negative iso dc'ed. Remains on ivf. All questions and concerns addressed.

## 2024-12-20 NOTE — PLAN OF CARE
Received pt at approx 1810 from ED transfer. Pt is A&Ox4, complaints of ABD cramping/pain returning- pt refusing pain medications. On room air. NSR, no chest pain. Continent of B&B, active bowel sounds, last BM 12-19: per pt having diarrhea, C diff (-), awaiting GI panel. Pt updated with plan of care.         Problem: Diabetes/Glucose Control  Goal: Glucose maintained within prescribed range  Description: INTERVENTIONS:  - Monitor Blood Glucose as ordered  - Assess for signs and symptoms of hyperglycemia and hypoglycemia  - Administer ordered medications to maintain glucose within target range  - Assess barriers to adequate nutritional intake and initiate nutrition consult as needed  - Instruct patient on self management of diabetes  Outcome: Progressing     Problem: Patient/Family Goals  Goal: Patient/Family Long Term Goal  Description: Patient's Long Term Goal: stay out of hospital 12-19    Interventions:  - med compliance, follow ups    - See additional Care Plan goals for specific interventions  Outcome: Progressing  Goal: Patient/Family Short Term Goal  Description: Patient's Short Term Goal: no pain 12-19    Interventions:   - PRN pain meds, hot/cold packs, tell nurse if in pain     - See additional Care Plan goals for specific interventions  Outcome: Progressing

## 2024-12-20 NOTE — PROGRESS NOTES
[facility]   part of [facility]     Hospitalist Progress Note     Jak Esquivel Patient Status:  Inpatient    1985 MRN [id number]   Location [facility] 0SW-A Attending James Rebolledo, *   Hosp Day # 1 PCP Tonia Gudino MD     Chief Complaint: Nausea/vomiting     Subjective:      - Still having ongoing nausea/vomiting, tolerating small amount of clears, though improving today compared to yesterday   - Denies any sick contacts, fevers/chills, recent travel    - No dysuria, flank pain     Objective:    Review of Systems:   A comprehensive review of systems was completed; pertinent positive and negatives stated in subjective.    Vital signs:  Temp:  [97 °F (36.1 °C)-98.4 °F (36.9 °C)] 98.4 °F (36.9 °C)  Pulse:  [] 72  Resp:  [16-20] 19  BP: ()/(69-92) 123/82  SpO2:  [93 %-100 %] 94 %    Physical Exam:    General: No acute distress  Respiratory: no wheezes, no rhonchi  Cardiovascular: S1, S2, regular rate and rhythm  Abdomen: Soft, Non-tender, non-distended, positive bowel sounds  Neuro: No new focal deficits.   Extremities: no edema    Diagnostic Data:    Labs:  Recent Labs   Lab 24  0750   WBC 21.4*   HGB 21.2*   MCV 85.0   .0   BAND 11       Recent Labs   Lab 24  0750 24  1314 24  0710   * 154* 159*   BUN 27* 20 23   CREATSERUM 3.22* 2.10* 1.74*   CA 9.8 8.7 9.3   ALB 4.9*  --   --    * 130* 134*   K 3.3* 3.6 3.6   CL 96* 98 96*   CO2 15.0* 15.0* 24.0   ALKPHO 86  --   --    AST 13  --   --    ALT 26  --   --    BILT 0.8  --   --    TP 7.9  --   --        Estimated Creatinine Clearance: 61.3 mL/min (A) (based on SCr of 1.74 mg/dL (H)).    No results for input(s): \"TROP\", \"TROPHS\", \"CK\" in the last 168 hours.    No results for input(s): \"PTP\", \"INR\" in the last 168 hours.               Microbiology    No results found for this visit on 24.      Imaging: Reviewed in Epic.    Medications:    heparin  5,000 Units Subcutaneous  Q8H [facility]    insulin aspart  2-10 Units Subcutaneous TID AC and HS       Assessment & Plan:      # Hyponatremia, improving  # Nausea/vomiting; suspected gastroenteritis  # TRACY, improving   -IVF continued, Cr improving  -hold hydrochlorothiazide at OR, resume as outpatient after f/u with PCP and repeat labs   -CLD and ADAT     #NAGMA due to gastrointestinal loss, resolved   -monitor with IVF  -GI Stool PCR negative, Cdiff negative     #Polycythemia likely due to above, improving   -repeat after hydration      #HTN  -hold antihypertensives      #GERD  -PPI     # DM2 - LDSSI; Last A1c value was 10.8% done 11/6/2024.   - On metformin, GLP-1 at home    - D/w pt, given elevated A1c, will need insulin at dc   - Diabetes APRN consulted, started on degludec    #Morbid Obesity  Body mass index is 39.61 kg/m².  -hold GLP-1     #ARNIE  -CPAP    James Rebolledo MD    Supplementary Documentation:     Quality:  DVT Mechanical Prophylaxis:     Early ambuation  DVT Pharmacologic Prophylaxis   Medication    heparin (Porcine) 5000 UNIT/ML injection 5,000 Units                Code Status: Not on file  Cervantes: No urinary catheter in place  Cervantes Duration (in days):   Central line:    LUPE:     The 21st Century Cures Act makes medical notes like these available to patients in the interest of transparency. Please be advised this is a medical document. Medical documents are intended to carry relevant information, facts as evident, and the clinical opinion of the practitioner. The medical note is intended as peer to peer communication and may appear blunt or direct. It is written in medical language and may contain abbreviations or verbiage that are unfamiliar.

## 2024-12-20 NOTE — PLAN OF CARE
Dbt educator to see patient  Abd cramping  Simethicone and bentyl given with positive results  Continues to have loose stools  IVF  Improving labs  Clear liquid diet

## 2024-12-20 NOTE — DIETARY NOTE
[facility]   part of [facility]   CLINICAL NUTRITION    Jak Esquivel     Admitting diagnosis:  Gastroenteritis [K52.9]  Acute kidney injury (HCC) [N17.9]    Ht: 180.3 cm (5' 11\")  Wt: 128.8 kg (284 lb).   Body mass index is 39.61 kg/m².    Wt Readings from Last 6 Encounters:   12/19/24 128.8 kg (284 lb)   11/06/24 (!) 139.7 kg (308 lb)   08/05/24 (!) 153.8 kg (339 lb)   02/12/24 (!) 153.8 kg (339 lb)   11/14/23 (!) 153.8 kg (339 lb)   01/07/23 (!) 142.4 kg (314 lb)      Labs/Meds reviewed    Diet:       Procedures    Full liquid diet Is Patient on Accuchecks? Yes     Percent Meals Eaten (last 3 days)       Date/Time Percent Meals Eaten (%)    12/19/24 1400 40 %    12/20/24 0900 50 %          Pt chart reviewed d/t pt with elevated A1C. RD provided education and handout for consistent carbohydrate diet for diabetes. Recommended carb counts per meal. RD discussed label reading. Discussed the role of CHO/protein and fat in BS control, reviewed importance of portion control, timing of meals and eating out appropriately.  Recommend DM center for further education. Pt receptive and expect compliance. All questions answered. RD available prn.     Patient reports poor appetite at this time. Pt with n/v PTA due to Mounjaro. Feeling somewhat better, had jello so far. Pt agreeable to ONS, added daily to max intakes   Nursing notes reports Percent Meals Eaten (%): 50 % intake for last meal.  Wt loss due to medication.     Patient is at low nutrition risk at this time.    Please consult if patient status changes or nutrition issues arise.    Luh Portillo RD, LDN  Clinical Dietitian

## 2024-12-21 VITALS
WEIGHT: 284 LBS | HEART RATE: 71 BPM | DIASTOLIC BLOOD PRESSURE: 93 MMHG | RESPIRATION RATE: 17 BRPM | OXYGEN SATURATION: 98 % | TEMPERATURE: 98 F | SYSTOLIC BLOOD PRESSURE: 127 MMHG | BODY MASS INDEX: 39.76 KG/M2 | HEIGHT: 71 IN

## 2024-12-21 LAB
ANION GAP SERPL CALC-SCNC: 9 MMOL/L (ref 0–18)
BASOPHILS # BLD AUTO: 0.06 X10(3) UL (ref 0–0.2)
BASOPHILS NFR BLD AUTO: 0.4 %
BUN BLD-MCNC: 21 MG/DL (ref 9–23)
CALCIUM BLD-MCNC: 9.3 MG/DL (ref 8.7–10.4)
CHLORIDE SERPL-SCNC: 99 MMOL/L (ref 98–112)
CO2 SERPL-SCNC: 28 MMOL/L (ref 21–32)
CREAT BLD-MCNC: 1.33 MG/DL
EGFRCR SERPLBLD CKD-EPI 2021: 70 ML/MIN/1.73M2 (ref 60–?)
EOSINOPHIL # BLD AUTO: 1.49 X10(3) UL (ref 0–0.7)
EOSINOPHIL NFR BLD AUTO: 10.3 %
ERYTHROCYTE [DISTWIDTH] IN BLOOD BY AUTOMATED COUNT: 12.5 %
GLUCOSE BLD-MCNC: 140 MG/DL (ref 70–99)
GLUCOSE BLD-MCNC: 144 MG/DL (ref 70–99)
GLUCOSE BLD-MCNC: 190 MG/DL (ref 70–99)
HCT VFR BLD AUTO: 49.6 %
HGB BLD-MCNC: 17.7 G/DL
IMM GRANULOCYTES # BLD AUTO: 0.34 X10(3) UL (ref 0–1)
IMM GRANULOCYTES NFR BLD: 2.4 %
LYMPHOCYTES # BLD AUTO: 4.6 X10(3) UL (ref 1–4)
LYMPHOCYTES NFR BLD AUTO: 31.9 %
MAGNESIUM SERPL-MCNC: 1.8 MG/DL (ref 1.6–2.6)
MCH RBC QN AUTO: 30.6 PG (ref 26–34)
MCHC RBC AUTO-ENTMCNC: 35.7 G/DL (ref 31–37)
MCV RBC AUTO: 85.7 FL
MONOCYTES # BLD AUTO: 0.88 X10(3) UL (ref 0.1–1)
MONOCYTES NFR BLD AUTO: 6.1 %
NEUTROPHILS # BLD AUTO: 7.06 X10 (3) UL (ref 1.5–7.7)
NEUTROPHILS # BLD AUTO: 7.06 X10(3) UL (ref 1.5–7.7)
NEUTROPHILS NFR BLD AUTO: 48.9 %
OSMOLALITY SERPL CALC.SUM OF ELEC: 287 MOSM/KG (ref 275–295)
PHOSPHATE SERPL-MCNC: 3.5 MG/DL (ref 2.4–5.1)
PLATELET # BLD AUTO: 181 10(3)UL (ref 150–450)
POTASSIUM SERPL-SCNC: 2.8 MMOL/L (ref 3.5–5.1)
RBC # BLD AUTO: 5.79 X10(6)UL
SODIUM SERPL-SCNC: 136 MMOL/L (ref 136–145)
WBC # BLD AUTO: 14.4 X10(3) UL (ref 4–11)

## 2024-12-21 PROCEDURE — 99239 HOSP IP/OBS DSCHRG MGMT >30: CPT | Performed by: STUDENT IN AN ORGANIZED HEALTH CARE EDUCATION/TRAINING PROGRAM

## 2024-12-21 RX ORDER — SIMETHICONE 80 MG
80 TABLET,CHEWABLE ORAL 3 TIMES DAILY PRN
Qty: 30 TABLET | Refills: 0 | Status: SHIPPED | OUTPATIENT
Start: 2024-12-21 | End: 2024-12-31

## 2024-12-21 RX ORDER — MAGNESIUM OXIDE 400 MG/1
400 TABLET ORAL ONCE
Status: COMPLETED | OUTPATIENT
Start: 2024-12-21 | End: 2024-12-21

## 2024-12-21 RX ORDER — POTASSIUM CHLORIDE 1500 MG/1
40 TABLET, EXTENDED RELEASE ORAL EVERY 4 HOURS
Status: COMPLETED | OUTPATIENT
Start: 2024-12-21 | End: 2024-12-21

## 2024-12-21 RX ORDER — LISINOPRIL 20 MG/1
20 TABLET ORAL 2 TIMES DAILY
Status: SHIPPED | COMMUNITY
Start: 2024-12-23

## 2024-12-21 RX ORDER — DICYCLOMINE HYDROCHLORIDE 10 MG/1
10 CAPSULE ORAL
Qty: 40 CAPSULE | Refills: 0 | Status: SHIPPED | OUTPATIENT
Start: 2024-12-21 | End: 2024-12-26

## 2024-12-21 RX ORDER — INSULIN DEGLUDEC 100 U/ML
25 INJECTION, SOLUTION SUBCUTANEOUS NIGHTLY
Qty: 5 EACH | Refills: 3 | Status: SHIPPED | OUTPATIENT
Start: 2024-12-21 | End: 2024-12-23

## 2024-12-21 RX ORDER — PEN NEEDLE, DIABETIC 32GX 5/32"
NEEDLE, DISPOSABLE MISCELLANEOUS
Qty: 100 EACH | Refills: 6 | Status: SHIPPED | OUTPATIENT
Start: 2024-12-21 | End: 2024-12-30

## 2024-12-21 NOTE — PLAN OF CARE
Assumed care of pt at 1930, pt stated pain and nausea improved post Bentyl and simethicone. Tolerating diet no emesis. On ivf. Remains with diarrhea. Denies pain at this time.

## 2024-12-21 NOTE — PROGRESS NOTES
[facility]   part of [facility]     Hospitalist Progress Note     Jak Esquivel Patient Status:  Inpatient    1985 MRN [id number]   Location [facility] 0SW-A Attending James Rebolledo, *   Hosp Day # 2 PCP Tonia Gudino MD     Chief Complaint: Nausea/vomiting     Subjective:      - Tolerating soft diet now without emesis, nausea; still having loose stools but feels he is able to keep up with oral fluids    - Denies any further abd pain, resolved with bentyl and simethicone    - Cr improving   - BG within goals with addition of degludec    - Denies other acute complaints     Objective:    Review of Systems:   A comprehensive review of systems was completed; pertinent positive and negatives stated in subjective.    Vital signs:  Temp:  [97.3 °F (36.3 °C)-98.6 °F (37 °C)] 97.4 °F (36.3 °C)  Pulse:  [] 67  Resp:  [18-19] 18  BP: (119-148)/() 126/89  SpO2:  [93 %-98 %] 98 %    Physical Exam:    General: No acute distress  Respiratory: no wheezes, no rhonchi  Cardiovascular: S1, S2, regular rate and rhythm  Abdomen: Soft, Non-tender, non-distended, positive bowel sounds  Neuro: No new focal deficits.   Extremities: no edema    Diagnostic Data:    Labs:  Recent Labs   Lab 24  0750 24  0710 24  0527   WBC 21.4* 16.2* 14.4*   HGB 21.2* 19.0* 17.7*   MCV 85.0 85.6 85.7   .0 238.0 181.0   BAND 11  --   --        Recent Labs   Lab 24  0750 24  1314 24  0710 24  0527   * 154* 159* 140*   BUN 27* 20 23 21   CREATSERUM 3.22* 2.10* 1.74* 1.33*   CA 9.8 8.7 9.3 9.3   ALB 4.9*  --   --   --    * 130* 134* 136   K 3.3* 3.6 3.6 2.8*   CL 96* 98 96* 99   CO2 15.0* 15.0* 24.0 28.0   ALKPHO 86  --   --   --    AST 13  --   --   --    ALT 26  --   --   --    BILT 0.8  --   --   --    TP 7.9  --   --   --        Estimated Creatinine Clearance: 80.2 mL/min (A) (based on SCr of 1.33 mg/dL (H)).    No results for input(s): \"TROP\",  \"TROPHS\", \"CK\" in the last 168 hours.    No results for input(s): \"PTP\", \"INR\" in the last 168 hours.               Microbiology    No results found for this visit on 12/19/24.      Imaging: Reviewed in Epic.    Medications:    potassium chloride  40 mEq Oral Q4H    magnesium oxide  400 mg Oral Once    dicyclomine  10 mg Oral TID AC&HS    simethicone  80 mg Oral TID    Or    simethicone  160 mg Oral TID    insulin degludec  25 Units Subcutaneous Daily    insulin aspart  1-20 Units Subcutaneous TID CC and HS    insulin aspart  1-68 Units Subcutaneous TID AC&HS    heparin  5,000 Units Subcutaneous Q8H [facility]       Assessment & Plan:      # Hyponatremia, improving  # Nausea/vomiting; suspected gastroenteritis  # TRACY, resolved    -IVF continued, Cr improving  -hold hydrochlorothiazide at [place], resume as outpatient after f/u with PCP and repeat labs   -CLD and ADAT     #NAGMA due to gastrointestinal loss, resolved   -monitor with IVF  -GI Stool PCR negative, Cdiff negative     #Polycythemia likely due to above, improving      #HTN  -hold antihypertensives at [place] till seen by PCP and repeat Cr normalized      #GERD  -PPI     # DM2 - LDSSI; Last A1c value was 9.4% done 12/20/2024.   - On metformin, GLP-1 at home    - D/w pt, given elevated A1c, will need insulin at [place]   - Diabetes APRN consulted, started on degludec at [place]; endo f/u     #Morbid Obesity  Body mass index is 39.61 kg/m².  -hold GLP-1 at [place]     #ARNIE  -CPAP    Home today with f/u with PCP, endocrinology     James Rebolledo MD    Supplementary Documentation:     Quality:  DVT Mechanical Prophylaxis:     Early ambuation  DVT Pharmacologic Prophylaxis   Medication    heparin (Porcine) 5000 UNIT/ML injection 5,000 Units                Code Status: Not on file  Cervantes: No urinary catheter in place  Cervantes Duration (in days):   Central line:    LUPE:     The 21st Century Cures Act makes medical notes like these available to patients in the interest of transparency.  Please be advised this is a medical document. Medical documents are intended to carry relevant information, facts as evident, and the clinical opinion of the practitioner. The medical note is intended as peer to peer communication and may appear blunt or direct. It is written in medical language and may contain abbreviations or verbiage that are unfamiliar.

## 2024-12-21 NOTE — PLAN OF CARE
Resumed care at 0730. Aox4, vitals stable. Fingerstick before meals, insulin given and educated on insulin administration. Denies pain. Diarrhea still, improved. Tolerating diet. Ambulating in room. Ok for discharge today. Discharged home with family, discharge with all belongings. IV and tele removed. Discharge instructions given and explained to patient, verbalized understanding. Ambulatory to lobby, wife to give ride home.

## 2024-12-23 ENCOUNTER — TELEPHONE (OUTPATIENT)
Dept: FAMILY MEDICINE CLINIC | Facility: CLINIC | Age: 39
End: 2024-12-23

## 2024-12-23 ENCOUNTER — TELEPHONE (OUTPATIENT)
Dept: INTERNAL MEDICINE UNIT | Facility: HOSPITAL | Age: 39
End: 2024-12-23

## 2024-12-23 ENCOUNTER — PATIENT OUTREACH (OUTPATIENT)
Dept: CASE MANAGEMENT | Age: 39
End: 2024-12-23

## 2024-12-23 DIAGNOSIS — E11.65 UNCONTROLLED TYPE 2 DIABETES MELLITUS WITH HYPERGLYCEMIA, WITHOUT LONG-TERM CURRENT USE OF INSULIN (HCC): Primary | ICD-10-CM

## 2024-12-23 RX ORDER — LANCETS
EACH MISCELLANEOUS
Qty: 300 EACH | Refills: 0 | Status: SHIPPED | OUTPATIENT
Start: 2024-12-23

## 2024-12-23 RX ORDER — BLOOD-GLUCOSE METER
KIT MISCELLANEOUS
Qty: 300 STRIP | Refills: 0 | Status: SHIPPED | OUTPATIENT
Start: 2024-12-23 | End: 2025-12-23

## 2024-12-23 RX ORDER — INSULIN GLARGINE 100 [IU]/ML
25 INJECTION, SOLUTION SUBCUTANEOUS NIGHTLY
Qty: 5 EACH | Refills: 0 | Status: SHIPPED | OUTPATIENT
Start: 2024-12-23 | End: 2025-03-23

## 2024-12-23 NOTE — TELEPHONE ENCOUNTER
Future Appointments   Date Time Provider Department Center   12/26/2024  4:00 PM Chely Ny APRN [facility] [place]   12/27/2024 11:00 AM Liz Mcwilliams MD [facility] [place]

## 2024-12-23 NOTE — PROGRESS NOTES
Update: Per Dr. Rebolledo, the patient should hold lisinopril, hydrochlorothiazide, and mounjaro until seen by the primary care physician. The patient can take metformin and amlodipine.    Nurse care manager also reviewed with the patient Lantus was sent to the pharmacy as an alternative of Tresiba.     The patient verbalized understanding.

## 2024-12-23 NOTE — TELEPHONE ENCOUNTER
Called by pharmacy that Tresiba not covered by insurance, lantus would be covered. Prescription for lantus electronically sent to pharmacy.     James Rebolledo MD

## 2024-12-23 NOTE — PAYOR COMM NOTE
--------------  CONTINUED STAY REVIEW FOR 12/20 WITH DISCHARGE ON 12/21      Payor: [employer] CHOICE/HMO/POS/EPO  Subscriber #:  [id number]  Authorization Number: [id number]    Admit date: 12/19/24  Admit time: 11:05 AM      12/20           Date of Service: 12/20/2024  7:38 AM     Signed            Hosp Day # 1 PCP Tonia Gudino MD      Chief Complaint: Nausea/vomiting      Subjective:       - Still having ongoing nausea/vomiting, tolerating small amount of clears, though improving today compared to yesterday   - Denies any sick contacts, fevers/chills, recent travel    - No dysuria, flank pain      Objective:    Review of Systems:   A comprehensive review of systems was completed; pertinent positive and negatives stated in subjective.     Vital signs:  Temp:  [97 °F (36.1 °C)-98.4 °F (36.9 °C)] 98.4 °F (36.9 °C)  Pulse:  [] 72  Resp:  [16-20] 19  BP: ()/(69-92) 123/82  SpO2:  [93 %-100 %] 94 %     Physical Exam:    General: No acute distress  Respiratory: no wheezes, no rhonchi  Cardiovascular: S1, S2, regular rate and rhythm  Abdomen: Soft, Non-tender, non-distended, positive bowel sounds  Neuro: No new focal deficits.   Extremities: no edema     Diagnostic Data:    Labs:      Recent Labs   Lab 12/19/24  0750   WBC 21.4*   HGB 21.2*   MCV 85.0   .0   BAND 11               Recent Labs   Lab 12/19/24  0750 12/19/24  1314 12/20/24  0710   * 154* 159*   BUN 27* 20 23   CREATSERUM 3.22* 2.10* 1.74*   CA 9.8 8.7 9.3   ALB 4.9*  --   --    * 130* 134*   K 3.3* 3.6 3.6   CL 96* 98 96*   CO2 15.0* 15.0* 24.0   ALKPHO 86  --   --    AST 13  --   --    ALT 26  --   --    BILT 0.8  --   --    TP 7.9  --   --           Imaging: Reviewed in Epic.     Medications:    heparin  5,000 Units Subcutaneous Q8H MARCELO    insulin aspart  2-10 Units Subcutaneous TID AC and HS         Assessment & Plan:       # Hyponatremia, improving  # Nausea/vomiting; suspected gastroenteritis  # TRACY, improving    -IVF continued, Cr improving  -hold hydrochlorothiazide at [place], resume as outpatient after f/u with PCP and repeat labs   -CLD and ADAT     #NAGMA due to gastrointestinal loss, resolved   -monitor with IVF  -GI Stool PCR negative, Cdiff negative     #Polycythemia likely due to above, improving   -repeat after hydration      #HTN  -hold antihypertensives      #GERD  -PPI     # DM2 - LDSSI; Last A1c value was 10.8% done 11/6/2024.   - On metformin, GLP-1 at home    - D/w pt, given elevated A1c, will need insulin at [place]   - Diabetes APRN consulted, started on degludec     #Morbid Obesity  Body mass index is 39.61 kg/m².  -hold GLP-1     #ARNIE  -CPAP     James Rebolledo MD                          12/21       Date of Service: 12/21/2024  7:15 AM     Signed               Chief Complaint: Nausea/vomiting      Subjective:       - Tolerating soft diet now without emesis, nausea; still having loose stools but feels he is able to keep up with oral fluids    - Denies any further abd pain, resolved with bentyl and simethicone    - Cr improving   - BG within goals with addition of degludec    - Denies other acute complaints      Objective:    Review of Systems:   A comprehensive review of systems was completed; pertinent positive and negatives stated in subjective.     Vital signs:  Temp:  [97.3 °F (36.3 °C)-98.6 °F (37 °C)] 97.4 °F (36.3 °C)  Pulse:  [] 67  Resp:  [18-19] 18  BP: (119-148)/() 126/89  SpO2:  [93 %-98 %] 98 %     Physical Exam:    General: No acute distress  Respiratory: no wheezes, no rhonchi  Cardiovascular: S1, S2, regular rate and rhythm  Abdomen: Soft, Non-tender, non-distended, positive bowel sounds  Neuro: No new focal deficits.   Extremities: no edema     Diagnostic Data:    Labs:        Recent Labs   Lab 12/19/24  0750 12/20/24  0710 12/21/24  0527   WBC 21.4* 16.2* 14.4*   HGB 21.2* 19.0* 17.7*   MCV 85.0 85.6 85.7   .0 238.0 181.0   BAND 11  --   --                 Recent Labs    Lab 12/19/24  0750 12/19/24  1314 12/20/24  0710 12/21/24  0527   * 154* 159* 140*   BUN 27* 20 23 21   CREATSERUM 3.22* 2.10* 1.74* 1.33*   CA 9.8 8.7 9.3 9.3   ALB 4.9*  --   --   --    * 130* 134* 136   K 3.3* 3.6 3.6 2.8*   CL 96* 98 96* 99   CO2 15.0* 15.0* 24.0 28.0   ALKPHO 86  --   --   --    AST 13  --   --   --    ALT 26  --   --   --    BILT 0.8  --   --   --    TP 7.9  --   --   --     Imaging: Reviewed in Epic.     Medications:    potassium chloride  40 mEq Oral Q4H    magnesium oxide  400 mg Oral Once    dicyclomine  10 mg Oral TID AC&HS    simethicone  80 mg Oral TID     Or    simethicone  160 mg Oral TID    insulin degludec  25 Units Subcutaneous Daily    insulin aspart  1-20 Units Subcutaneous TID CC and HS    insulin aspart  1-68 Units Subcutaneous TID AC&HS    heparin  5,000 Units Subcutaneous Q8H MARCELO         Assessment & Plan:       # Hyponatremia, improving  # Nausea/vomiting; suspected gastroenteritis  # TRACY, resolved    -IVF continued, Cr improving  -hold hydrochlorothiazide at [place], resume as outpatient after f/u with PCP and repeat labs   -CLD and ADAT     #NAGMA due to gastrointestinal loss, resolved   -monitor with IVF  -GI Stool PCR negative, Cdiff negative     #Polycythemia likely due to above, improving      #HTN  -hold antihypertensives at [place] till seen by PCP and repeat Cr normalized      #GERD  -PPI     # DM2 - LDSSI; Last A1c value was 9.4% done 12/20/2024.   - On metformin, GLP-1 at home    - D/w pt, given elevated A1c, will need insulin at [place]   - Diabetes APRN consulted, started on degludec at [place]; endo f/u      #Morbid Obesity  Body mass index is 39.61 kg/m².  -hold GLP-1 at dc     #ARNIE  -CPAP     Home today with f/u with PCP, endocrinology      James Rebolledo MD                                Vitals (last day) before discharge       Date/Time Temp Pulse Resp BP SpO2 Weight O2 Device O2 Flow Rate (L/min) [facility]    12/21/24 1145 98.1 °F (36.7 °C) 71 17 127/93 98 %  -- None (Room air) -- BB    12/21/24 0757 97.9 °F (36.6 °C) 72 20 122/79 97 % -- None (Room air) -- BB    12/21/24 0515 97.4 °F (36.3 °C) 67 18 126/89 98 % -- -- -- HT    12/20/24 2330 97.3 °F (36.3 °C) 73 18 128/89 93 % -- -- -- HT    12/20/24 1930 98.6 °F (37 °C) 83 18 135/84 93 % -- -- -- HT    12/20/24 1600 98 °F (36.7 °C) 101 18 148/101 96 % -- -- -- AC    12/20/24 1230 98 °F (36.7 °C) 86 19 119/83 94 % -- -- -- AC    12/20/24 0700 98.4 °F (36.9 °C) 72 19 123/82 94 % -- -- -- AC    12/20/24 0551 97.8 °F (36.6 °C) 73 19 116/82 95 % -- -- -- OG    12/20/24 0100 97.4 °F (36.3 °C) 78 20 128/74 95 % -- -- -- OG            Medication Administration Report  for Alvin Jak S as of 12/12/24 through 12/21/24  Legend:       Medications 12/12 12/13 12/14 12/15 12/16 12/17 12/18 12/19 12/20 12/21   Completed Medications   magnesium oxide (Mag-Ox) tab 400 mg  Dose: 400 mg  Freq: Once Route: OR  Start: 12/21/24 0800 End: 12/21/24 0855             [id number]        ondansetron (Zofran) 4 MG/2ML injection 4 mg  Dose: 4 mg  Freq: Once Route: IV  Start: 12/19/24 0740 End: 12/19/24 0747   Admin Instructions:   For Nausea and Vomiting           [id number]          potassium chloride (Klor-Con M20) tab 40 mEq  Dose: 40 mEq  Freq: Every 4 hours Route: OR  Start: 12/21/24 0800 End: 12/21/24 1144   Admin Instructions:   Do not crush             [id number]     94350        sodium chloride 0.9 % IV bolus 1,000 mL  Dose: 1,000 mL  Freq: Once Route: IV  Start: 12/19/24 0826 End: 12/19/24 1000           56640     42777          sodium chloride 0.9 % IV bolus 1,000 mL  Dose: 1,000 mL  Freq: Once Route: IV  Start: 12/19/24 0740 End: 12/19/24 0825           06801     48449          Discontinued Medications   Medications 12/12 12/13 12/14 12/15 12/16 12/17 12/18 12/19 12/20 12/21                                                                                                                                            dicyclomine (Bentyl) cap 10 mg  Dose:  10 mg  Freq: 3 x daily before meals and at bedtime Route: OR  Start: 12/20/24 1100 End: 12/21/24 1440            [id number]     [id number]     318270      827327     334573                                               heparin (Porcine) 5000 UNIT/ML injection 5,000 Units  Dose: 5,000 Units  Freq: Every 8 hours scheduled Route: SC  Start: 12/19/24 1400 End: 12/21/24 1440           (1400)14     284415      467551     982636     529449      783639     20        insulin aspart (NovoLOG) 100 Units/mL FlexPen 1-20 Units  Dose: 1-20 Units  Freq: 3 times daily with meals and at bedtime Route: SC  Start: 12/20/24 1200 End: 12/21/24 1440   Admin Instructions:   1 unit of Novolog (aspart) insulin for every 10 grams of carbohydrates eaten  Give within 10 minutes before or after a meal  Do not give if patient is NPO (carbohydrate is on hold)            [id number]     715115     (2100)23      (0800)24     25        insulin aspart (NovoLOG) 100 Units/mL FlexPen 1-68 Units  Dose: 1-68 Units  Freq: 3 x daily before meals and at bedtime Route: SC  Start: 12/20/24 1100 End: 12/21/24 1440   Admin Instructions:   Correction Insulin - calculate insulin requirement  Give 1 unit of NovoLOG (insulin aspart) for every 30 points blood glucose greater than 140 mg/dL  **DO NOT HOLD OR ALTER INSULIN DOSE WITHOUT A PHYSICIAN ORDER**  Give NovoLOG/insulin aspart subcutaneously within 30 minutes of scheduled finger-stick time  Notify physician for blood glucose > 351 mg/dL            (1100) [C]26     [id number]     (2200)28      (0700)29     (1100) [C]30        insulin aspart (NovoLOG) 100 Units/mL FlexPen 2-10 Units  Dose: 2-10 Units  Freq: 3 times daily before meals and nightly Route: SC  Start: 12/19/24 1600 End: 12/20/24 1024   Admin Instructions:   CORRECTION FACTOR - COLUMN MEDIUM DOSE.  Continue to give correction insulin (Novolog/aspart) even if NPO; DO NOT HOLD OR ALTER INSULIN DOSE WITHOUT A PHYSICIAN ORDER.  1 unit of Novolog/aspart insulin is expected  to decrease blood glucose by 20 mg/dL.    Give 2 unit if blood glucose 141-180 mg/dL  Give 3 units if blood glucose 181-220 mg/dL  Give 6 units if blood glucose 221-260 mg/dL  Give 8 units if blood glucose 261-300 mg/dL  Give 10 units if blood glucose 301-350 mg/dL  Call Physician if blood glucose is greater than 351 mg/dl with time and last dose of correction insulin given.           [id number]     298404      066343         insulin degludec (Tresiba) 100 units/mL flextouch 25 Units  Dose: 25 Units  Freq: Daily Route: SC  Start: 12/20/24 1030 End: 12/21/24 1440   Admin Instructions:   This is LONG ACTING insulin.  Continue to give basal insulin (insulin degludec - Tresiba) even if NPO.  DO NOT hold or alter insulin dose without a physician order.            [id number]      621540        lactated ringers infusion  Rate: 125 mL/hr  Freq: Continuous Route: IV  Start: 12/19/24 1300 End: 12/21/24 1440           [id number]     845799     771703     617008      744648     958946      42     738682        melatonin tab 3 mg  Dose: 3 mg  Freq: Nightly PRN Route: OR  PRN Comment: sleep  Start: 12/19/24 1254 End: 12/21/24 1440                ondansetron (Zofran) 4 MG/2ML injection 4 mg  Dose: 4 mg  Freq: Every 6 hours PRN Route: IV  PRN Reasons: Nausea,vomiting  Start: 12/19/24 1254 End: 12/21/24 1440   Admin Instructions:   Default antiemetic sequence (unless otherwise preferred by patient):  1. ondansetron (Zofran)  2. prochlorperazine (Compazine). Wait 15 minutes before proceeding to next medication in sequence.  Follow therapeutic duplication policy.           [id number]                                                     simethicone (Mylicon) chewable tab 80 mg  Dose: 80 mg  Freq: 3 times daily Route: OR  Start: 12/20/24 0915 End: 12/21/24 1440            [id number]     46     518657      910224        Or   simethicone (Mylicon) chewable tab 160 mg  Dose: 160 mg  Freq: 3 times daily Route: OR  Start: 12/20/24 0915 End: 12/21/24 1440             49     [id number]     51      52                                                                                                                                                                    Medications 12/12 12/13 12/14 12/15 12/16 12/17 12/18 12/19 12/20 12/21   Administration Detail

## 2024-12-23 NOTE — TELEPHONE ENCOUNTER
Spoke to the patient today for a Transitional Care Management hospital follow up call. The patient does not have a hospital follow up appointment scheduled at this time.Nurse care manager attempted to schedule but there were no openings within the Transitional Care Management time frame.      A Transitional Care Management/hospital follow up appointment is recommended by 12/28/2024 as the patient is a high risk for readmission.  Please advise.    BOOK BY DATE (last date for Transitional Care Management): 1/4/2025        Refill request: The patient is requesting refills of test strips and lancets for the Accu-Chek glucometer at home. The patient confirmed testing 3 times daily. The patient would refills sent to the following pharmacy:     Pharmacy    [facility] [address] [place] [address] [phone], [phone]       Please follow up with the patient to assist with scheduling a Transitional Care Management/hospital follow up appointment and send the requested refills per protocol.  Thank you!

## 2024-12-23 NOTE — PROGRESS NOTES
Transitional Care Management   Discharge Date: 24  Contact Date: 2024    Assessment:  TCM Initial Assessment    General:  Assessment completed with: Patient  Patient Subjective: The patient reports improvement with symptoms. The patient reported improvement with diarrhea that decreased in frequency and has become more formed. The patient denies any nausea/vomiting, melena, hematochezia, increasing abdominal pain, weakness, dizziness, loss of appetite, confusion, muscle spasms/cramps, headache, seizures or gait/ balance disturbances. The patient has not checked blood pressure/glucose readings yet since hospital discharge. The patient reported plans to  Tresiba from the pharmacy later today.  Chief Complaint: Hyponatremia  Nausea/vomiting; suspected gastroenteritis   TRACY   NAGMA due to gastrointestinal loss  Polycythemia likely due to above  HTN  GERD   DM2  Morbid Obesity  ARNIE  Verify patient name and  with patient/ caregiver: Yes    Hospital Stay/Discharge:  Tell me what you understand of why you were in the hospital or emergency department: I was dehydrated and in the hospital.  Prior to leaving the hospital were your Discharge Instructions reviewed with you?: Yes  Did you receive a copy of your written Discharge Instructions?: Yes  What questions do you have about your Discharge Instructions?: Not at this time.  Do you feel better or worse since you left the hospital or emergency department?: Better    Follow - Up Appointment:  Do you have a follow-up appointment?: Yes  Date: 24  Physician: endocrinology  Are there any barriers to getting to your follow-up appointment?: Yes    Home Health/DME:  Prior to leaving the hospital was Home Health (HH) arranged for you?: N/A  Are HH needs identified by staff during the assessment?: No     Prior to leaving the hospital or emergency department was Durable Medical Equipment (DME), medical supplies, or infusions arranged for you?: No  Are  DME/medical supply/infusions needs identified by staff during this assessment?: No (The patient confirmed having glucometer at home.)     Medications/Diet:  Did any of your medications change, during or after your hospital stay or ED visit?: Yes  Do you have your new or updated medications?: No (The patient will  insulin from the pharmacy later today.)  Do you understand what your medications are for and possible side effects?: Yes  Are there any reasons that keep you from taking your medication as prescribed?: Yes (The patient was told verbally to hold lisinopril and metformin until seen by Dr. Gudino. The hospitalist was contacted to verify.)  Any concerns about medication refills?: Yes (The patient requested refills for lancets and test strips.)    Were you given a different diet per your Discharge Instructions?: Yes  Diet Type: low fiber/diabetic diet  Are there any barriers to following that diet?: No     Questions/Concerns:  Do you have any questions or concerns that have not been discussed?: Yes (The hospitalist team was contacted for prescription clarification.)       Nursing Interventions:    Patient symptoms were assessed, education was completed for signs/symptoms to monitor, and reviewed when to contact providers versus go to the emergency department/call 911.   Reviewed all discharge instructions.   Educated the patient on signs/symptoms of dehydration and anemia.   Reviewed a low fiber/soft and diabetic diet.   All medications were reviewed and educated on the importance of taking the medications as directed.     The hospitalist was contacted to confirm medications held at hospital discharge.   A telephone encounter was sent to the primary care physician office for a refill request for lancets and test strips.     Appointments were reviewed and stressed the importance of a close follow up with providers.  A telephone encounter was sent to the primary care provider's office for an appointment  request/review.     Confirmed patient has DME (medical equipment) and understands proper use.(Glucometer)   The patient verbalized understanding and will contact the office with any further questions or concerns.       Medication Review:   NCM did confirm the patient has discontinued the following as directed:    hydroCHLOROthiazide 25 MG Tabs   Mounjaro 2.5 MG/0.5ML Soaj (Tirzepatide)     Current Outpatient Medications   Medication Sig Dispense Refill    insulin glargine (LANTUS SOLOSTAR) 100 UNIT/ML Subcutaneous Solution Pen-injector Inject 25 Units into the skin nightly. 5 each 0    lisinopril 20 MG Oral Tab Take 1 tablet (20 mg total) by mouth 2 (two) times daily.      insulin degludec (TRESIBA FLEXTOUCH) 100 UNIT/ML Subcutaneous Solution Pen-injector Inject 25 Units into the skin nightly. 5 each 3    Insulin Pen Needle (BD PEN NEEDLE ARIELA U/F) 32G X 4 MM Does not apply Misc Inject 1 times per day. Use a new pen needle with each injection 100 each 6    simethicone 80 MG Oral Chew Tab Chew 1 tablet (80 mg total) by mouth 3 (three) times daily as needed for FLATULENCE. 30 tablet 0    dicyclomine 10 MG Oral Cap Take 1 capsule (10 mg total) by mouth TID AC&HS for 10 days. 40 capsule 0    metFORMIN HCl 1000 MG Oral Tab Take 1 tablet (1,000 mg total) by mouth 2 (two) times daily with meals. 180 tablet 0    amLODIPine 2.5 MG Oral Tab Take 1 tablet (2.5 mg total) by mouth in the morning and 1 tablet (2.5 mg total) before bedtime. 180 tablet 0    ACCU-CHEK GUIDE In Vitro Strip USE ONE NEW TEST STRIP EACH TIME TO TEST BLOOD SUGARS IN THE MORNING, AT NOON AND AT BEDTIME 100 strip 2    Blood Glucose Monitoring Suppl (ACCU-CHEK GUIDE ME) w/Device Does not apply Kit 1 each daily. 1 kit 0    Accu-Chek FastClix Lancets Does not apply Misc Test three times daily. 300 each 0    omeprazole 20 MG Oral Capsule Delayed Release Take 1 capsule (20 mg total) by mouth every morning before breakfast.       Did patient review medications  using current pill bottles and not just a medication list?  Yes  Discharge medications reviewed/discussed/and reconciled against outpatient medications with patient.  Any changes or updates to medications sent to primary care provider.  Patient Acknowledged  The patient reported prescription for lantus will be ready for  from the pharmacy later today.     The patient reported being told verbally to hold metformin and lisinopril until seen by the primary care physician. Nurse care manager did review discharge instructions had the patient resume both medications. Nurse care manager did review endocrinology notes with recommendations to resume metformin as well.     The patient did resume amlodipine at hospital discharge. Nurse care manager contacted the hospitalist to confirm medication recommendations for metformin and lisinopril.     The patient requested refills of lancets and test strips to the following pharmacy:   Pharmacy    [facility] [address] [place] [address] [phone], [phone]       A telephone encounter was sent to the primary care physician office for a refill request.       SDOH:   Transportation:   Transportation Needs: No Transportation Needs (12/23/2024)    Transportation Needs     Lack of Transportation: No     Car Seat: Not on file       Financial strain:   Financial Resource Strain: Low Risk  (12/23/2024)    Financial Resource Strain     Difficulty of Paying Living Expenses: Not hard at all     Med Affordability: No       Follow-up Appointments:  Your appointments       Date & Time Appointment Department ([place])    Dec 26, 2024 4:00 PM CST Exam - New Patient with hCely Ny APRN [facility] ([facility])              [address], [facility] [address]  [facility] [address]  [phone]            Transitional Care  Clinic  Was TCC Ordered: No      Primary Care Provider (If no TCC appointment)  Does patient already have a PCP appointment scheduled? No  Nurse Care Manager Attempted to schedule PCP office TCM appointment with patient but there were no openings in the recommended Transitional Care Management time frame. A telephone encounter was sent to the primary care physician office for an appointment request.       Specialist  Does the patient have any other follow-up appointment(s) that need to be scheduled? Yes   -If yes: Nurse Care Manager reviewed upcoming specialist appointments with patient: Yes Reji jacques scheduled with endocrinology on 12/26/2024.    -Does the patient need assistance scheduling appointment(s): No    CCM referral placed:  Not Applicable    Book By Date: 1/4/2025

## 2024-12-26 ENCOUNTER — OFFICE VISIT (OUTPATIENT)
Facility: CLINIC | Age: 39
End: 2024-12-26
Payer: COMMERCIAL

## 2024-12-26 VITALS
SYSTOLIC BLOOD PRESSURE: 126 MMHG | BODY MASS INDEX: 42.19 KG/M2 | WEIGHT: 301.38 LBS | OXYGEN SATURATION: 97 % | HEIGHT: 71 IN | RESPIRATION RATE: 17 BRPM | DIASTOLIC BLOOD PRESSURE: 80 MMHG | HEART RATE: 78 BPM

## 2024-12-26 DIAGNOSIS — I10 PRIMARY HYPERTENSION: ICD-10-CM

## 2024-12-26 DIAGNOSIS — E11.65 TYPE 2 DIABETES MELLITUS WITH HYPERGLYCEMIA, WITH LONG-TERM CURRENT USE OF INSULIN (HCC): Primary | ICD-10-CM

## 2024-12-26 DIAGNOSIS — E11.65 UNCONTROLLED TYPE 2 DIABETES MELLITUS WITH HYPERGLYCEMIA, WITHOUT LONG-TERM CURRENT USE OF INSULIN (HCC): ICD-10-CM

## 2024-12-26 DIAGNOSIS — E78.5 HYPERLIPIDEMIA, UNSPECIFIED HYPERLIPIDEMIA TYPE: ICD-10-CM

## 2024-12-26 DIAGNOSIS — Z79.4 TYPE 2 DIABETES MELLITUS WITH HYPERGLYCEMIA, WITH LONG-TERM CURRENT USE OF INSULIN (HCC): Primary | ICD-10-CM

## 2024-12-26 LAB
GLUCOSE BLOOD: 157
TEST STRIP LOT #: NORMAL NUMERIC

## 2024-12-26 PROCEDURE — 99214 OFFICE O/P EST MOD 30 MIN: CPT | Performed by: NURSE PRACTITIONER

## 2024-12-26 PROCEDURE — 82947 ASSAY GLUCOSE BLOOD QUANT: CPT | Performed by: NURSE PRACTITIONER

## 2024-12-26 NOTE — PROGRESS NOTES
Subjective:   Jak Esquivel is a 39 year old male who presents for hospital follow up.   He was discharged from [facility] to Home or Self Care  Admission Date: 12/19/24   Discharge Date: 12/21/24  Hospital Discharge Diagnosis: Gastroenteritis and TRACY    Interactive contact within 2 business days post discharge first initiated on Date: 12/23/2024    I accessed [employer] and/or Care Everywhere and personally reviewed the following for the recent hospitalization: provider notes, consults, summaries, labs and other test results and the pertinent findings are documented below.     HPI: Pt was hospitalized from December 19th to December 21st due to severe abdominal pain, vomiting, and diarrhea following his third dose of Mounjaro. The pain is described as nonstop, and he was unable to retain fluids, leading to dehydration and hospitalization. Symptoms began a couple of hours after Mounjaro administration and persisted for approximately four days.    He stopped taking mounjaro and was started on insulin therapy a few days ago. Patient also saw endocrinologist who suggested potential adjustments in diabetes management, including reintroducing Mounjaro or trying medications like Ozempic or Trulicity. Next visit with endocrinologist  is in 6 weeks, around March 13th. Pt is planning to work on weight loss, healthy diet and exercise to manage weight.    Pt also mentions he was told to stop taking Lisinopril for BP due to recent TRACY. Endocrinologist ordered BMP, which patient is planning to get done soon.    History/Other:   Current Medications:  Medication Reconciliation:  I am aware of an inpatient discharge within the last 30 days.  The discharge medication list has been reconciled with the patient's current medication list and reviewed by me. See medication list for additions of new medication, and changes to current doses of medications and discontinued medications.  Outpatient Medications Marked as Taking for the 12/27/24  encounter (Office Visit) with Liz Mcwilliams MD   Medication Sig    metFORMIN HCl ER, OSM, 500 MG (OSM) Oral Tablet 24 Hr Take 1 tablet (500 mg total) by mouth 2 (two) times daily with meals.    Lidocaine HCl-Hydrocortisone 3-0.5 % External Cream Apply 1 Application topically in the morning and 1 Application before bedtime.    insulin glargine (LANTUS SOLOSTAR) 100 UNIT/ML Subcutaneous Solution Pen-injector Inject 25 Units into the skin nightly.    Accu-Chek FastClix Lancets Does not apply Misc Test three times daily.    Insulin Pen Needle (BD PEN NEEDLE ARIELA U/F) 32G X 4 MM Does not apply Misc Inject 1 times per day. Use a new pen needle with each injection    simethicone 80 MG Oral Chew Tab Chew 1 tablet (80 mg total) by mouth 3 (three) times daily as needed for FLATULENCE.    amLODIPine 2.5 MG Oral Tab Take 1 tablet (2.5 mg total) by mouth in the morning and 1 tablet (2.5 mg total) before bedtime.    ACCU-CHEK GUIDE In Vitro Strip USE ONE NEW TEST STRIP EACH TIME TO TEST BLOOD SUGARS IN THE MORNING, AT NOON AND AT BEDTIME    Blood Glucose Monitoring Suppl (ACCU-CHEK GUIDE ME) w/Device Does not apply Kit 1 each daily.    omeprazole 20 MG Oral Capsule Delayed Release Take 1 capsule (20 mg total) by mouth every morning before breakfast.       Review of Systems:  As per HPI    Objective:   No LMP for male patient.  Estimated body mass index is 41.56 kg/m² as calculated from the following:    Height as of this encounter: 5' 11\" (1.803 m).    Weight as of this encounter: 298 lb (135.2 kg).   BP (!) 128/98   Pulse 87   Temp 97.5 °F (36.4 °C) (Temporal)   Resp 14   Ht 5' 11\" (1.803 m)   Wt 298 lb (135.2 kg)   SpO2 97%   BMI 41.56 kg/m²      Physical Exam  Constitutional:       Appearance: Normal appearance.   HEENT:      Head: Normocephalic and atraumatic.   Cardiovascular:      Rate and Rhythm: Normal rate and regular rhythm.   Pulmonary:      Effort: Pulmonary effort is normal.      Breath sounds: Normal breath  sounds.   Abdominal:      General: Bowel sounds are normal.      Palpations: Abdomen is soft. There is no mass.   Musculoskeletal:         General: Normal range of motion.    Skin:     General: Skin is warm and dry.   Neurological:      General: No focal deficit present.      Mental Status: Alert and oriented to person, place, and time.   Psychiatric:         Mood and Affect: Mood normal.         Thought Content: Thought content normal.     Assessment & Plan:   1. Hospital discharge follow-up (Primary)  2. Gastroenteritis  Comments:  Improving, stop mounjaro as planned  Mounjaro added to allergy list  Discussed options for oral hydration  3. TRACY (acute kidney injury) ([facility])  Comments:  Continue oral hydration  Repeat BMP ordered, awaiting results  4. Type 2 diabetes mellitus with hyperglycemia, without long-term current use of insulin ([facility])  Comments:  Continue insulin as prescribed   Discussed metformin 500mg BID instead of 1000mg BID d/t recent diarrhea  Discussed low carb diet and exercise for weight loss  Follow with Endo for further recs   5. External hemorrhoids  -     Lidocaine HCl-Hydrocortisone; Apply 1 Application topically in the morning and 1 Application before bedtime.  Dispense: 57 g; Refill: 0        Return in about 3 months (around 3/27/2025) for DM follow up.

## 2024-12-26 NOTE — PATIENT INSTRUCTIONS
Return Visit:  APN: Return in about 6 weeks (around 2/6/2025).  [] Video visit  [x] In person only      [x]  Diabetes Education: in 1 weeks     Diet/Lifestyle:   [] Carb counting 101   [] Carb Aware (T2DM nutrition counseling)   [] Carb Aware (T2DM nutrition counseling) + BG check in   [] Insulin dose fine tuning (If not yet carb counting, start with carb count 101)  [] DM burnout counseling    Medication/devices:   [] Pump settings check in after changes made today   [] Starting insulin pump ( 90 mins)   [] Pump 101 ( learning about pumps and carb counting)   [] BG check in   [x] Ordered CGM today- Patient will need to call the office to schedule CGM training once they receive their device. Call Office phone number: [phone] to schedule      []  Directions to 1st floor lab    []  Give blood sugar log  []  PAP paperwork for Ozempic/Jardiance (english/Persian)     Summary of today's visit:  Medication changes:    Continue Lantus 25 units every morning, if next week fasting glucose is still above 130, increase lantus from 25 to 29 units  Continue Metformin 1000 mg twice daily.   - message US or call US back once you see that the c peptide is present , will start you with Trulicity 0.75 mg weekly x 4 weeks   - fax US the note of the ophthalmology   - Call your insurance to find out which continuous glucose monitor is covered/approved under your insurance. You can then call our office to let us know and we can place orders accordingly. We will then schedule you with Murry for training and set up for your glucose monitor.   - If we started a new medication today that may not be covered by your insurance, our staff will reach out to you regarding any changes in the plan   - If on insulin, please ensure you have glucagon at home for emergencies   - continue your appointment with bariatric team     Additional comments:   - If labs were ordered today, please complete prior to your follow up visit -fasting   - Please let  us know if you require any refills at least 1 week prior to your medication running out   - Please call our office if sugars at home are consistently greater than 250 or less than 70     HOW TO TREAT LOW BLOOD SUGAR (Hypoglycemia)  Low blood sugar= Less than 70, or if you start to have symptoms (below)  Symptoms: Shaking or trembling, fast heart rate, extreme hunger, sweating, confusion/difficulty concentrating, dizziness.    How to treat a low blood sugar if you are able to eat/drink: The Rule of 15/15  If you are using continuous glucose monitor that says you are low, but you do not have any symptoms, verify on fingerstick that your blood sugar is actually low before treating.   Eat 15 grams of carbs (see examples below)  Check your blood sugar after 15 minutes. If it’s still below your target range, have another serving.   Repeat these steps until it’s in your target range. Once it’s in range, if you're nervous about your sugar going low again, have a protein source (ie, a spoonful of peanut butter).   If you have a CGM you want to look for how your arrow has changed. If you arrow is pointed up or sideways after 15 min, give your CGM more time OR check with a finger stick. Try not to eat more food until at least 15 min after the first BG check - otherwise you risk having a rebound high.  If you are experiencing symptoms and you are unable to check your blood glucose for any reason, treat the hypoglycemia.  If someone has a low blood sugar and is unconscious: Don’t hesitate to call 911. If someone is unconscious and glucagon is not available or someone does not know how to use it, call 911 immediately.     To treat a low, I recommend you carry with you easy, pre-portioned treatment for low blood sugars that are 15G of carbs:   - Children sized squeeze pouch applesauce (high fiber + carbs help prevent too high of a spike)  - Small children's sized juicebox- 15g carb --> 4oz juice box  - Glucose tablets from  [facility], you can find them near diabetes supplies --> Note, you will need to eat 3-4 tablets to get to 15g of carbs  - Children sized fruit snack pack- look for one with 15 grams of total carbohydrate  - Choice of how to treat your low is important. Complex carbs, or foods that contain fats along with carbs (like chocolate) can slow the absorption of glucose and should NOT be used to treat an emergency low

## 2024-12-26 NOTE — PROGRESS NOTES
[facility]    Name: Jak Esquivel    Date: 12/26/24    Jak Esquivel is a 39 year old male who presents for evaluation of T2DM management.     Chief complaint: New Patient (New patient hospital follow up due to dehydration from being on Mounjaro 2.5 mg was on for 3 weeks. Pt started lantus 2 days ago injecting 2 units daily and Metformin 1000 mg 1 tab 2 x daily. Checking BG with meter- recently broke states BG averaging 150-170 when checking 1-2 x daily //BG in office not fasting 157/Last A1c value was 9.4% done 12/20/2024./Last foot 6/2024/Last eye: N/A)       Subjective:   DM hx:  -Diagnosed with diabetes in age 37, had pre-diabetes for many years prior  -Started insulin: started insulin this past Dec, 2024   -Family history- yes, mother    Initial HPI consult - 12/26/2024  Here to establish care.   Discharged last 12/21/24 in the hospital due to gastroenteritis and hyponatremia  (symptoms of  NVD)   He also started Mounjaro about 3 weeks ago   DM meds at first office visit: metformin 1000mg twice daily,  lantus  25u every day, but was taking 2 units in the last 2 days, insurance did not get dispense it right away,    aspart   Blood Glucose log reviewed. Checking 1 times per day. Patient did not bring his/her glucometer today, pt. reported range  150-160 before breakfast , episodes of hypoglycemia: No  States meter broke yesterday       -Re: potential DM medication contraindication (if positive, checkbox selected):  [] History of pancreatitis-denies   [] Personal/fam hx of medullary thyroid cancer/MEN2-denies   [] History of recent/frequent UTI/yeast infxn-denies   [] Previous amputation related to diabetes-denies   [] Hx of [id number] euglycemic DKA-denies    -Presence of associated DM complications (if positive, checkbox selected):  [] Macrovascular complications (CAD/CVA/PAD)-denies , + hx left ventricular hypertrophy   [] Neuropathy-denies   [] Retinopathy-denies   []  Nephropathy-denies   [x] HTN  [x] Hyperlipidemia  [] Stroke/TIA- denies   [] Gastroparesis-denies   [] Wound, ulcer or amputations-denies     - Lifestyle:   - Modifying factors: states was not checking his glucose level prior hospitalisation, compliant with medicine   - Steroid use: No      Previously trialed/failed DM meds: mounjaro     History/Other:    Allergies, PMH, SocHx and FHx reviewed and updated as appropriate in Epic on    Blood Glucose Monitoring Suppl Does not apply Kit Use to test BG. 1 kit 0    insulin glargine (LANTUS SOLOSTAR) 100 UNIT/ML Subcutaneous Solution Pen-injector Inject 25 units daily (For insurance purposes only: TDD max: 32 units per day) 30 mL 1    Insulin Pen Needle (BD PEN NEEDLE ARIELA U/F) 32G X 4 MM Does not apply Misc Inject 1 times per day. Use a new pen needle with each injection 100 each 6    [DISCONTINUED] Blood Glucose Monitoring Suppl Does not apply Kit Use to test BG. (Patient not taking: Reported on 12/27/2024) 1 kit 0    simethicone 80 MG Oral Chew Tab Chew 1 tablet (80 mg total) by mouth 3 (three) times daily as needed for FLATULENCE. 30 tablet 0    [DISCONTINUED] metFORMIN HCl 1000 MG Oral Tab Take 1 tablet (1,000 mg total) by mouth 2 (two) times daily with meals. 180 tablet 0    amLODIPine 2.5 MG Oral Tab Take 1 tablet (2.5 mg total) by mouth in the morning and 1 tablet (2.5 mg total) before bedtime. 180 tablet 0    omeprazole 20 MG Oral Capsule Delayed Release Take 1 capsule (20 mg total) by mouth every morning before breakfast.       Allergies[1]  Current Outpatient Medications   Medication Sig Dispense Refill    Blood Glucose Monitoring Suppl Does not apply Kit Use to test BG. 1 kit 0    insulin glargine (LANTUS SOLOSTAR) 100 UNIT/ML Subcutaneous Solution Pen-injector Inject 25 units daily (For insurance purposes only: TDD max: 32 units per day) 30 mL 1    Insulin Pen Needle (BD PEN NEEDLE ARIELA U/F) 32G X 4 MM Does not apply Misc Inject 1 times per day. Use a new pen  needle with each injection 100 each 6    simethicone 80 MG Oral Chew Tab Chew 1 tablet (80 mg total) by mouth 3 (three) times daily as needed for FLATULENCE. 30 tablet 0    amLODIPine 2.5 MG Oral Tab Take 1 tablet (2.5 mg total) by mouth in the morning and 1 tablet (2.5 mg total) before bedtime. 180 tablet 0    omeprazole 20 MG Oral Capsule Delayed Release Take 1 capsule (20 mg total) by mouth every morning before breakfast.      metFORMIN HCl ER, OSM, 500 MG (OSM) Oral Tablet 24 Hr Take 1 tablet (500 mg total) by mouth 2 (two) times daily with meals.      Lidocaine HCl-Hydrocortisone 3-0.5 % External Cream Apply 1 Application topically in the morning and 1 Application before bedtime. 57 g 0    hydrocortisone 2.5 % External Cream Place 1 Application rectally 2 (two) times daily for 7 days. 28 g 0    Accu-Chek FastClix Lancets Does not apply Misc Test three times daily. 300 each 0    Glucose Blood (FREESTYLE LITE TEST) In Vitro Strip Use as directed. Patient tests 3 times a day (Patient not taking: Reported on 12/27/2024) 300 strip 0    lisinopril 20 MG Oral Tab Take 1 tablet (20 mg total) by mouth 2 (two) times daily. (Patient not taking: Reported on 12/27/2024)      ACCU-CHEK GUIDE In Vitro Strip USE ONE NEW TEST STRIP EACH TIME TO TEST BLOOD SUGARS IN THE MORNING, AT NOON AND AT BEDTIME 100 strip 2    Blood Glucose Monitoring Suppl (ACCU-CHEK GUIDE ME) w/Device Does not apply Kit 1 each daily. 1 kit 0     Past Medical History:    Diabetes (HCC)    High blood pressure    HTN (hypertension)    LVH (left ventricular hypertrophy)    Sleep apnea    no device yet at time of screen 1/24    Visual impairment    glasses     Family History   Problem Relation Age of Onset    Breast Cancer Mother     Heart Attack Mother     Heart Disease Mother     Diabetes Father     Colon Cancer Sister     Thyroid disease Sister     Diabetes Maternal Grandmother     Diabetes Maternal Grandfather     Diabetes Paternal Grandmother      Diabetes Paternal Grandfather      Social history: Reviewed.      ROS/Exam    REVIEW OF SYSTEMS: Ten point review of systems has been performed and is otherwise negative and/or non-contributory, except as described above.     VITALS  Vitals:    12/26/24 1622   BP: 126/80   Pulse: 78   Resp: 17   SpO2: 97%   Weight: (!) 301 lb 6.4 oz (136.7 kg)   Height: 5' 11\" (1.803 m)       Wt Readings from Last 6 Encounters:   12/27/24 298 lb (135.2 kg)   12/26/24 (!) 301 lb 6.4 oz (136.7 kg)   12/19/24 284 lb (128.8 kg)   11/06/24 (!) 308 lb (139.7 kg)   08/05/24 (!) 339 lb (153.8 kg)   02/12/24 (!) 339 lb (153.8 kg)       PHYSICAL EXAM  CONSTITUTIONAL:  awake, alert, cooperative, no apparent distress, and appears stated age Vss stable   PSYCH: normal affect  LUNGS: breathing comfortably  CARDIOVASCULAR:  regular rate   NECK:  no palpable thyroid nodules     Labs/Imaging:   Lab Results   Component Value Date    CHOLEST 219 (H) 07/06/2024    CHOLEST 203 (H) 12/27/2022    TRIG 302 (H) 07/06/2024    TRIG 232 (H) 12/27/2022    HDL 36 (L) 07/06/2024    HDL 37 (L) 12/27/2022     (H) 07/06/2024     (H) 12/27/2022     No results found for: \"MICROALBCREA\"   Lab Results   Component Value Date    CREATSERUM 1.33 (H) 12/21/2024    CREATSERUM 1.74 (H) 12/20/2024    EGFRCR 70 12/21/2024    EGFRCR 51 (L) 12/20/2024     Lab Results   Component Value Date    AST 13 12/19/2024    AST 60 (H) 07/06/2024    ALT 26 12/19/2024     (H) 07/06/2024       No results found for: \"TSH\", \"T4F\"     Overall glucose control:  Lab Results   Component Value Date    A1C 9.4 (H) 12/20/2024    A1C 10.8 (A) 11/06/2024    A1C 12.5 (H) 07/06/2024    A1C 7.0 (H) 03/10/2023    A1C 11.4 (H) 12/27/2022       Supplementary Documentation:   -Surveillance for Diabetes Complications & Risks  Foot exam/neuropathy: Last Foot Exam: 11/06/24    Retinopathy screening: No data recordedNo data recorded  6 months ago per pt    Assessment & Plan:     ICD-10-CM    1.  Type 2 diabetes mellitus with hyperglycemia, with long-term current use of insulin ([facility])  E11.65 TSH and Free T4    Z79.4 Basic Metabolic Panel (8)     C-Peptide     IA-2 Autoantibodies     ALBA-65 Autoantibody     Zinc Transporter 8 (ZnT8) Antibodies     Islet Cell Cytoplasmic Antibody, IgG     ASSAY QUANTITATIVE, GLUCOSE     Blood Glucose Monitoring Suppl Does not apply Kit     Insulin Pen Needle (BD PEN NEEDLE ARIELA U/F) 32G X 4 MM Does not apply Misc     DISCONTINUED: Blood Glucose Monitoring Suppl Does not apply Kit      2. Primary hypertension  I10       3. Hyperlipidemia, unspecified hyperlipidemia type  E78.5 Lipid Panel      4. BMI 40.0-44.9, adult ([facility])  Z68.41       5. Uncontrolled type 2 diabetes mellitus with hyperglycemia, without long-term current use of insulin ([facility])  E11.65 insulin glargine (LANTUS SOLOSTAR) 100 UNIT/ML Subcutaneous Solution Pen-injector          Jak Esquivel is a pleasant 39 year old male here for evaluation of:    #Diabetes  PMHx of Type 2 diabetes mellitus diagnosed in in age 37, had pre-diabetes for many years prior.    - he was discharged last 12/21/24 in the hospital due to gastroenteritis and hyponatremia  (symptoms of  NVD) , was initially on Mounjaro and after discharged it was not resumed.    - Last A1c value was 9.4% done 12/20/2024. Above goal  - Goal <7%. Importance of better glucose control in preventing onset/progression of end-organ damage discussed, as well as goals of therapy and clinical significance of A1C.   - Discussed to patient that if potentially mounjaro is too strong for him and we will try to use other glp1 a.   Plan:  - med changes:  - Continue Lantus 25 units every morning, if next week fasting glucose is still above 130, increase lantus from 25 to 29 units  Continue Metformin 1000 mg twice daily.   -  message US or call US back once you see that the c peptide is present , will start you with Trulicity 0.75 mg weekly x 4 weeks   - fax US the note of  the ophthalmology   - start CGM--patient to call insurance and inquire which one is covered, then to notify the clinic with phone (connected to clinic profile)  - patient is on insulin, and has suboptimal glycemic control including wide glycemic swings, thus would benefit from CGM  - continue to check BG 2x/day using glucometer or CGM  - meet with endo DM educator re:CGM teaching  - Discussed adding GLP-1 to current medication regimen, including action, risk vs benefit, dosing, and potential side effects. Patient denies hx of pancreatitis, gastroparesis, or personal or family hx of medullary thyroid CA or MEN 2.    - add on NORY ab r/o and ordered tfts  - Discussed management of low glucose and targeted glucose levels and provided instructions in AVS   -See above header \"Supplementary Documentation\" for surveillance for diabetes complications & risks    Nephropathy screening:   Lab Results   Component Value Date    EGFRCR 70 12/21/2024    MALBCREACALC 22 07/06/2024     Lab Results   Component Value Date    CREAUR 78 07/06/2024    MICROALBUMIN 1.7 07/06/2024    MALBCREACALC 22 07/06/2024     Blood pressure control: - SBP is to goal <130   BP Readings from Last 1 Encounters:   12/27/24 (!) 128/98   BP Meds: amLODIPine Tabs - 2.5 MG; lisinopril Tabs - 20 MG     #Hyperlipidemia  Lipids: LDL is not to goal ,repeating fasting lipid and will start statin if neded.   Lab Results   Component Value Date     (H) 07/06/2024    TRIG 302 (H) 07/06/2024   Cholesterol Meds:       #BMI 42  - discussed Balanced diet: carbohydrates, protein, healthy fats and fiber in each meal. Exercise of at least 150 mins each week. Decrease your simple carbohydrates intake such as sweets: cookies, soda, candy, white rice, white pasta, syrups      Return in about 6 weeks (around 2/6/2025).    Total time spent  45 minutes today on obtaining history, reviewing pertinent labs, evaluating patient, providing multiple treatment options, reinforcing  diet/exercise and compliance, and completing documentation, of which greater than 50% was spent in face to face discussion with the patient   who demonstrated understanding and agreement with plan.     Thank you for allowing me to participate in the care of this patient.  Please feel free to contact me with any questions.    MERARI Driscoll, [facility]-BC  Endocrinology, Diabetes & Metabolism   12/26/24    In reviewing this note, please be advised that Dragon Voice Recognition software used to dictate the note may have made errors in recognizing some of the words or phrases.     Note to patient: The 21 Century Cures Act makes medical notes like these available to patients in the interest of transparency. However, be advised this is a medical document. It is intended as peer to peer communication. It is written in medical language and may contain abbreviations or verbiage that are unfamiliar. It may appear blunt or direct. Medical documents are intended to carry relevant information, facts as evident, and the clinical opinion of the practitioner.           [1]   Allergies  Allergen Reactions    Mounjaro [Tirzepatide] DIARRHEA

## 2024-12-27 ENCOUNTER — TELEPHONE (OUTPATIENT)
Dept: FAMILY MEDICINE CLINIC | Facility: CLINIC | Age: 39
End: 2024-12-27

## 2024-12-27 ENCOUNTER — OFFICE VISIT (OUTPATIENT)
Dept: FAMILY MEDICINE CLINIC | Facility: CLINIC | Age: 39
End: 2024-12-27
Payer: COMMERCIAL

## 2024-12-27 VITALS
OXYGEN SATURATION: 97 % | DIASTOLIC BLOOD PRESSURE: 98 MMHG | TEMPERATURE: 98 F | RESPIRATION RATE: 14 BRPM | HEIGHT: 71 IN | HEART RATE: 87 BPM | WEIGHT: 298 LBS | SYSTOLIC BLOOD PRESSURE: 128 MMHG | BODY MASS INDEX: 41.72 KG/M2

## 2024-12-27 DIAGNOSIS — N17.9 AKI (ACUTE KIDNEY INJURY) (HCC): ICD-10-CM

## 2024-12-27 DIAGNOSIS — K52.9 GASTROENTERITIS: ICD-10-CM

## 2024-12-27 DIAGNOSIS — K64.4 EXTERNAL HEMORRHOIDS: Primary | ICD-10-CM

## 2024-12-27 DIAGNOSIS — K64.4 EXTERNAL HEMORRHOIDS: ICD-10-CM

## 2024-12-27 DIAGNOSIS — Z09 HOSPITAL DISCHARGE FOLLOW-UP: Primary | ICD-10-CM

## 2024-12-27 DIAGNOSIS — E11.65 TYPE 2 DIABETES MELLITUS WITH HYPERGLYCEMIA, WITHOUT LONG-TERM CURRENT USE OF INSULIN (HCC): ICD-10-CM

## 2024-12-27 PROBLEM — E11.69 DYSLIPIDEMIA ASSOCIATED WITH TYPE 2 DIABETES MELLITUS (HCC): Status: ACTIVE | Noted: 2023-10-05

## 2024-12-27 PROBLEM — G47.33 OBSTRUCTIVE SLEEP APNEA SYNDROME: Status: ACTIVE | Noted: 2023-10-24

## 2024-12-27 PROBLEM — E88.810 METABOLIC SYNDROME X: Status: ACTIVE | Noted: 2023-10-05

## 2024-12-27 PROBLEM — E78.5 DYSLIPIDEMIA ASSOCIATED WITH TYPE 2 DIABETES MELLITUS (HCC): Status: ACTIVE | Noted: 2023-10-05

## 2024-12-27 RX ORDER — METFORMIN HYDROCHLORIDE EXTENDED-RELEASE TABLETS 500 MG/1
500 TABLET, FILM COATED, EXTENDED RELEASE ORAL 2 TIMES DAILY WITH MEALS
COMMUNITY
End: 2024-12-30

## 2024-12-27 RX ORDER — HYDROCORTISONE 25 MG/G
1 CREAM TOPICAL 2 TIMES DAILY
Qty: 28 G | Refills: 0 | Status: SHIPPED | OUTPATIENT
Start: 2024-12-27 | End: 2025-01-03

## 2024-12-27 NOTE — TELEPHONE ENCOUNTER
Lidocaine-hydrocortisone denied by insurance.  Would you like to send alternative?      Denied    Note from payer: Request Reference Number: [id number].  HYDROCAINE   CRE 3-0.5% is denied for not meeting the prior authorization requirement(s).  Details of this decision are in the notice attached below or have been faxed to you.  Payer: [employer] Part D Case ID: [id number]    [phone]    [phone]  Electronic appeal: Not supported  Appeal instructions: Appeals are not supported through ePA. Please refer to the fax case notice for appeals information and instructions.  View History

## 2024-12-27 NOTE — DISCHARGE SUMMARY
[facility]OSPITAL[facility]  DISCHARGE SUMMARY     Jak Esquivel Patient Status:  Observation    1985 MRN [id number]   Location [facility] 0SW-A Attending No att. providers found   Hosp Day # 0 PCP Tonia Gudino MD     Date of Admission: 2024  Date of Discharge: 2024  Discharge Disposition: Home or Self Care    Admitting Diagnosis:   Gastroenteritis [K52.9]  Acute kidney injury (HCC) [N17.9]    Hospital Discharge Diagnoses:   Gastroenteritis  Intractable nausea and vomiting  None anion gap metabolic acidosis  Polycythemia  Hypertension  GERD  Morbid obesity  ARNIE on CPAP    Lace+ Score: 73  59-90 High Risk  29-58 Medium Risk  0-28   Low Risk.    TCM Follow-Up Recommendation:   GastroenteritisLACE > 58: High Risk of readmission after discharge from the hospital.        Discharge Diagnosis:   Gastroenteritis    History of Present Illness: Jak Esquivel is a 39 year old male with past medical history of hypertension, diabetes presented to the emergency department with nausea vomiting diarrhea     Patient was started on Mounjaro 3 weeks ago.  He is taking a starting dose of 2.5mg   His first and second dose were uncomplicated, he took his third dose this past  morning as usual.  He had been feeling fine prior to this.  He developed nausea and emesis with profuse watery diarrhea.  It has been progressive and worsening since then.  The last 2 days he has not been able to eat anything and thus has not taken his antihypertensives fortunately.  He has some vague abdominal discomfort as well.  He denies any sick contacts.    Brief Synopsis: Patient presented for intractable nausea and vomiting, thought gastroenteritis, though possibly contributed by new onset of GLP-1, diuretics.  Patient started on IV fluids, hydrochlorothiazide and lisinopril held.  C. difficile and GI PCR completed and negative.  Patient improved with conservative therapy, able to tolerate soft diet at time of discharge without  nausea and vomiting.  Discussed elevated A1c at admission up to 9.4, diabetes APRN consulted.  Patient advised to hold Mounjaro at discharge, started insulin Lantus at bedtime with outpatient diabetes APRN follow-up after discharge.  Patient also advised to hold home lisinopril and hydrochlorothiazide till follow-up with PCP and repeat labs. All diagnoses discussed with patient and family, they demonstrated understanding and plan of care and risks reviewed and in agreement.     Procedures during hospitalization:       Incidental or significant findings and recommendations (brief descriptions):  Mounjaro held, start Lantus at discharge, follow-up with diabetes APRN  Holding lisinopril and HCTZ at discharge until seen by PCP and repeat labs    Lab/Test results pending at Discharge:       Consultants:  Diabetes APRN    Discharge Medication List:     Discharge Medications        START taking these medications        Instructions Prescription details   BD Pen Needle Geneva U/F 32G X 4 MM Misc  Generic drug: Insulin Pen Needle      Inject 1 times per day. Use a new pen needle with each injection   Quantity: 100 each  Refills: 6     simethicone 80 MG Chew  Commonly known as: Mylicon      Chew 1 tablet (80 mg total) by mouth 3 (three) times daily as needed for FLATULENCE.   Stop taking on: December 31, 2024  Quantity: 30 tablet  Refills: 0            CONTINUE taking these medications        Instructions Prescription details   Accu-Chek Guide Me w/Device Kit      1 each daily.   Quantity: 1 kit  Refills: 0     Accu-Chek Guide Strp      USE ONE NEW TEST STRIP EACH TIME TO TEST BLOOD SUGARS IN THE MORNING, AT NOON AND AT BEDTIME   Quantity: 100 strip  Refills: 2     amLODIPine 2.5 MG Tabs  Commonly known as: Norvasc      Take 1 tablet (2.5 mg total) by mouth in the morning and 1 tablet (2.5 mg total) before bedtime.   Quantity: 180 tablet  Refills: 0     lisinopril 20 MG Tabs  Commonly known as: Prinivil; Zestril      Take 1  tablet (20 mg total) by mouth 2 (two) times daily.   Refills: 0     metFORMIN HCl 1000 MG Tabs  Commonly known as: GLUCOPHAGE      Take 1 tablet (1,000 mg total) by mouth 2 (two) times daily with meals.   Quantity: 180 tablet  Refills: 0     omeprazole 20 MG Cpdr  Commonly known as: PriLOSEC      Take 1 capsule (20 mg total) by mouth every morning before breakfast.   Refills: 0            STOP taking these medications      Accu-Chek FastClix Lancets Misc        hydroCHLOROthiazide 25 MG Tabs        Mounjaro 2.5 MG/0.5ML Soaj  Generic drug: Tirzepatide                  Where to Get Your Medications        These medications were sent to [facility] [address] [facility] - [place], [place] - [address] [facility] [phone], [phone]  [address] [facility] [address]      Phone: [phone]   BD Pen Needle Geneva U/F 32G X 4 MM Misc  simethicone 80 MG Chew         [place]    Follow-up appointment:   Tonia Gudino MD  [address] [address] [phone]    Schedule an appointment as soon as possible for a visit in 1 week(s)  Call to make hospital follow-up appointment    Chely Ny APRN  [address]  [address] [address] [phone]    Go on 12/26/2024  Go to appointment at  on 12/26 to establish care in Endocrinology      Vital signs:       Physical Exam:  See exam from day of discharge note  -----------------------------------------------------------------------------------------------  PATIENT DISCHARGE INSTRUCTIONS: See electronic chart    James Rebolledo MD 12/26/2024    Time spent:  35 minutes

## 2024-12-29 PROBLEM — N17.9 AKI (ACUTE KIDNEY INJURY) (HCC): Status: ACTIVE | Noted: 2024-12-19

## 2024-12-29 PROBLEM — N17.9 AKI (ACUTE KIDNEY INJURY): Status: ACTIVE | Noted: 2024-12-19

## 2024-12-29 PROBLEM — K64.4 EXTERNAL HEMORRHOIDS: Status: ACTIVE | Noted: 2024-12-29

## 2024-12-30 RX ORDER — METFORMIN HYDROCHLORIDE 500 MG/1
1000 TABLET, EXTENDED RELEASE ORAL 2 TIMES DAILY WITH MEALS
Qty: 360 TABLET | Refills: 1 | Status: SHIPPED | OUTPATIENT
Start: 2024-12-30

## 2024-12-30 RX ORDER — PEN NEEDLE, DIABETIC 32GX 5/32"
NEEDLE, DISPOSABLE MISCELLANEOUS
Qty: 100 EACH | Refills: 6 | Status: SHIPPED | OUTPATIENT
Start: 2024-12-30

## 2024-12-30 RX ORDER — INSULIN GLARGINE 100 [IU]/ML
INJECTION, SOLUTION SUBCUTANEOUS
Qty: 30 ML | Refills: 1 | Status: SHIPPED | OUTPATIENT
Start: 2024-12-30

## 2025-01-05 ENCOUNTER — TELEPHONE (OUTPATIENT)
Dept: FAMILY MEDICINE CLINIC | Facility: CLINIC | Age: 40
End: 2025-01-05

## 2025-01-10 ENCOUNTER — TELEPHONE (OUTPATIENT)
Facility: CLINIC | Age: 40
End: 2025-01-10

## 2025-01-10 LAB
BUN: 13 MG/DL (ref 7–25)
C-PEPTIDE: 4.19 NG/ML (ref 0.8–3.85)
CALCIUM: 8.7 MG/DL (ref 8.6–10.3)
CARBON DIOXIDE: 29 MMOL/L (ref 20–32)
CHLORIDE: 104 MMOL/L (ref 98–110)
CHOL/HDLC RATIO: 4.6 (CALC)
CHOLESTEROL, TOTAL: 165 MG/DL
CREATININE: 0.72 MG/DL (ref 0.6–1.26)
EGFR: 120 ML/MIN/1.73M2
GLUCOSE: 179 MG/DL (ref 65–99)
GLUTAMIC ACID DECARBOXYLASE 65 AB: <5 IU/ML
HDL CHOLESTEROL: 36 MG/DL
IA-2 ANTIBODY: <5.4 U/ML
LDL-CHOLESTEROL: 104 MG/DL (CALC)
NON-HDL CHOLESTEROL: 129 MG/DL (CALC)
POTASSIUM: 3.8 MMOL/L (ref 3.5–5.3)
SODIUM: 140 MMOL/L (ref 135–146)
T4, FREE: 1.3 NG/DL (ref 0.8–1.8)
TRIGLYCERIDES: 145 MG/DL
TSH: 2.14 MIU/L (ref 0.4–4.5)
ZINC TRANSPORTER 8 (ZNT8) ANTIBODY: <10 U/ML

## 2025-01-10 NOTE — TELEPHONE ENCOUNTER
Endo staff- Pls call patient and find out if he got the blood test done I ordered from Last office visit 12/26/24:NORY work up sent to [facility].    he was discharged last 12/21/24 in the hospital due to gastroenteritis and hyponatremia  (symptoms of  NVD) , was initially on Mounjaro and after discharged it was not resumed.

## 2025-01-22 ENCOUNTER — TELEPHONE (OUTPATIENT)
Dept: FAMILY MEDICINE CLINIC | Facility: CLINIC | Age: 40
End: 2025-01-22

## 2025-01-22 NOTE — TELEPHONE ENCOUNTER
Last office visit with Dr. Mcwilliams on 12/27/24 for hospital follow-up    Last labs on 1/2/2025

## 2025-01-22 NOTE — TELEPHONE ENCOUNTER
Patient had labs done on 1/2/25 that was placed by Dr Chely Ny.  Patient states he would like to talk to Dr Mcwilliams, he would like to get her opinion on the labs.   Patient states he was put on insulin, and he wants to talk to her about it.

## 2025-01-23 NOTE — TELEPHONE ENCOUNTER
1/22/2025 12:12 PM Y Eleanor Islas RN Patient Medical Advice Request  labs     Patient viewed DashThis message

## 2025-01-29 ENCOUNTER — TELEPHONE (OUTPATIENT)
Dept: FAMILY MEDICINE CLINIC | Facility: CLINIC | Age: 40
End: 2025-01-29

## 2025-01-29 NOTE — TELEPHONE ENCOUNTER
Received a refill request from pharmacy for Amlodipine.    Last prescribed on 12/9/2024 for 90 day refill

## 2025-02-14 DIAGNOSIS — E11.65 UNCONTROLLED TYPE 2 DIABETES MELLITUS WITH HYPERGLYCEMIA, WITHOUT LONG-TERM CURRENT USE OF INSULIN (HCC): ICD-10-CM

## 2025-02-14 DIAGNOSIS — Z79.4 TYPE 2 DIABETES MELLITUS WITH HYPERGLYCEMIA, WITH LONG-TERM CURRENT USE OF INSULIN (HCC): ICD-10-CM

## 2025-02-14 DIAGNOSIS — E11.65 TYPE 2 DIABETES MELLITUS WITH HYPERGLYCEMIA, WITH LONG-TERM CURRENT USE OF INSULIN (HCC): ICD-10-CM

## 2025-02-14 NOTE — TELEPHONE ENCOUNTER
Endocrine Refill protocol for oral and injectable diabetic medications    Protocol Criteria:  FAILED  Reason: Elevated A1C    If all below requirements are met, send a 90-day supply with 1 refill per provider protocol.    Verify appointment with Endocrinology completed in the last 6 months or scheduled in the next 3 months.  Verify A1C has been completed within the last 6 months and is below 8.5%     Last completed office visit: 2/10/2025 [facility]   Next scheduled Follow up:   Future Appointments   Date Time Provider Department Center   3/13/2025  4:00 PM Chely Ny APRN [facility]      Last A1c result: Last A1c value was 9.4% done 12/20/2024.       My chart message sent to patient regarding GLP tolerance, will route to provider once patient replies.

## 2025-02-24 RX ORDER — INSULIN GLARGINE 100 [IU]/ML
INJECTION, SOLUTION SUBCUTANEOUS
COMMUNITY
Start: 2025-02-24

## 2025-02-24 RX ORDER — DULAGLUTIDE 1.5 MG/.5ML
1.5 INJECTION, SOLUTION SUBCUTANEOUS WEEKLY
Qty: 2 ML | Refills: 0 | Status: SHIPPED | OUTPATIENT
Start: 2025-02-24

## 2025-02-24 NOTE — TELEPHONE ENCOUNTER
Call placed to patient to follow up on questions sent on 2/14.  Spoke with patient who states:     No side effects, currently checking bg meter twice daily, fbg today was 120 mg/dL, never above 150 mg/L at either time    - Confirmed 25 u lantus, and metformin 1000 mf 2x daily     Routed to provider for review

## 2025-03-05 DIAGNOSIS — I10 PRIMARY HYPERTENSION: ICD-10-CM

## 2025-03-06 RX ORDER — HYDROCHLOROTHIAZIDE 25 MG/1
25 TABLET ORAL DAILY
Qty: 90 TABLET | Refills: 0 | Status: SHIPPED | OUTPATIENT
Start: 2025-03-06 | End: 2025-05-14

## 2025-03-06 RX ORDER — LISINOPRIL 20 MG/1
20 TABLET ORAL 2 TIMES DAILY
Qty: 180 TABLET | Refills: 0 | Status: SHIPPED | OUTPATIENT
Start: 2025-03-06 | End: 2025-05-14

## 2025-03-06 NOTE — TELEPHONE ENCOUNTER
Lisinopril last refilled 12/23/24  No future appointment in office  LOV with  12/27/24    BP Readings from Last 3 Encounters:   12/27/24 (!) 128/98   12/26/24 126/80   12/21/24 (!) 127/93        Hydrochlorothiazide     The original prescription was discontinued on 12/21/2024 by James Rebolledo MD for the following reason:  Stop Taking at Discharge. Renewing this prescription may not be appropriate.         Hypertension Medications Protocol Vutbqb1203/05/2025 09:16 PM   Protocol Details Last BP reading less than 140/90    Medication is active on med list    CMP or BMP in past 12 months    In person appointment or virtual visit in the past 12 mos or appointment in next 3 mos    EGFRCR or GFRNAA > 50

## 2025-03-08 RX ORDER — AMLODIPINE BESYLATE 2.5 MG/1
TABLET ORAL
Qty: 60 TABLET | Refills: 2 | Status: SHIPPED | OUTPATIENT
Start: 2025-03-08

## 2025-03-08 NOTE — TELEPHONE ENCOUNTER
Hypertension Medications Protocol Zlatzn0203/08/2025 12:20 AM   Protocol Details Last BP reading less than 140/90    Medication is active on med list    CMP or BMP in past 12 months    In person appointment or virtual visit in the past 12 mos or appointment in next 3 mos    EGFRCR or GFRNAA > 50     BP Readings from Last 3 Encounters:   12/27/24 (!) 128/98   12/26/24 126/80   12/21/24 (!) 127/93       Last office visit 12/27/24  Last refilled on 12/9/24 for # 180 with 0 refills  Future Appointments   Date Time Provider Department Center   3/21/2025  8:45 AM Chely Ny APRN [facility]        Thank you.

## 2025-03-20 DIAGNOSIS — E11.65 UNCONTROLLED TYPE 2 DIABETES MELLITUS WITH HYPERGLYCEMIA, WITHOUT LONG-TERM CURRENT USE OF INSULIN (HCC): ICD-10-CM

## 2025-03-20 RX ORDER — BLOOD SUGAR DIAGNOSTIC
STRIP MISCELLANEOUS
Qty: 300 STRIP | Refills: 1 | Status: SHIPPED | OUTPATIENT
Start: 2025-03-20

## 2025-03-20 RX ORDER — INSULIN GLARGINE 100 [IU]/ML
INJECTION, SOLUTION SUBCUTANEOUS
Qty: 30 ML | Refills: 0 | Status: SHIPPED | OUTPATIENT
Start: 2025-03-20

## 2025-03-20 NOTE — TELEPHONE ENCOUNTER
Diabetic Supplies Protocol Passed03/20/2025 12:25 AM   Protocol Details In person appointment or virtual visit in the past 12 mos or appointment in next 3 mos    Medication is active on med list

## 2025-03-20 NOTE — TELEPHONE ENCOUNTER
Endocrine refill protocol for basal insulins     Protocol Criteria: FAILED Reason: Elevated A1C    If all below requirements are met, send a 90-day supply with 1 refill per provider protocol.       Verify Appointment with Endocrinology completed in the last 6 months or scheduled in the next 3 months.  Verify A1C has been completed within the last 6 months and is below 8.5%     Last completed office visit:2/10/2025 [facility]ET[facility]UC[facility]   Next scheduled Follow up:   Future Appointments   Date Time Provider Department Center   3/21/2025  8:45 AM Chely yN APRN [facility]      Last A1c result: Last A1c value was 9.4% done 12/20/2024.     Pended and routed for review.

## 2025-04-02 DIAGNOSIS — Z79.4 TYPE 2 DIABETES MELLITUS WITH HYPERGLYCEMIA, WITH LONG-TERM CURRENT USE OF INSULIN (HCC): ICD-10-CM

## 2025-04-02 DIAGNOSIS — E11.65 TYPE 2 DIABETES MELLITUS WITH HYPERGLYCEMIA, WITH LONG-TERM CURRENT USE OF INSULIN (HCC): ICD-10-CM

## 2025-04-02 RX ORDER — DULAGLUTIDE 1.5 MG/.5ML
1.5 INJECTION, SOLUTION SUBCUTANEOUS WEEKLY
Qty: 6 ML | Refills: 1 | Status: SHIPPED | OUTPATIENT
Start: 2025-04-02

## 2025-04-02 NOTE — TELEPHONE ENCOUNTER
Endocrine Refill protocol for oral and injectable diabetic medications    Protocol Criteria:  PASSED  Reason: N/A    If all below requirements are met, send a 90-day supply with 1 refill per provider protocol.    Verify appointment with Endocrinology completed in the last 6 months or scheduled in the next 3 months.  Verify A1C has been completed within the last 6 months and is below 8.5%     Last completed office visit: 3/21/2025 Chely Ny APRN   Next scheduled Follow up: No future appointments.   Last A1c result: Last A1c value was 9.4% done 12/20/2024.      Passed per protocol and sent to pharmacy.

## 2025-04-10 DIAGNOSIS — Z79.4 TYPE 2 DIABETES MELLITUS WITH HYPERGLYCEMIA, WITH LONG-TERM CURRENT USE OF INSULIN (HCC): ICD-10-CM

## 2025-04-10 DIAGNOSIS — E11.65 TYPE 2 DIABETES MELLITUS WITH HYPERGLYCEMIA, WITH LONG-TERM CURRENT USE OF INSULIN (HCC): ICD-10-CM

## 2025-04-10 RX ORDER — PEN NEEDLE, DIABETIC 32GX 5/32"
NEEDLE, DISPOSABLE MISCELLANEOUS
Qty: 100 EACH | Refills: 1 | Status: SHIPPED | OUTPATIENT
Start: 2025-04-10

## 2025-04-10 NOTE — TELEPHONE ENCOUNTER
Endocrine Refill protocol for Glucose testing supplies     Protocol Criteria: PASSED Reason: N/A    If below requirement is met, send a 90-day supply with 1 refill per provider protocol.    Verify appointment with Endocrinology completed in the last 6 months or scheduled in the next 3 months.    Last completed office visit: 3/21/2025 Chely Ny APRN   Next scheduled Follow up: No future appointments.

## 2025-04-27 DIAGNOSIS — E11.65 UNCONTROLLED TYPE 2 DIABETES MELLITUS WITH HYPERGLYCEMIA, WITHOUT LONG-TERM CURRENT USE OF INSULIN (HCC): ICD-10-CM

## 2025-05-12 DIAGNOSIS — K76.0 STEATOSIS OF LIVER: ICD-10-CM

## 2025-05-12 DIAGNOSIS — E66.01 MORBID OBESITY (HCC): ICD-10-CM

## 2025-05-12 DIAGNOSIS — I10 PRIMARY HYPERTENSION: ICD-10-CM

## 2025-05-12 DIAGNOSIS — G47.33 OBSTRUCTIVE SLEEP APNEA SYNDROME: ICD-10-CM

## 2025-05-12 DIAGNOSIS — E78.2 MIXED HYPERLIPIDEMIA: ICD-10-CM

## 2025-05-12 DIAGNOSIS — E11.65 TYPE 2 DIABETES MELLITUS WITH HYPERGLYCEMIA, WITHOUT LONG-TERM CURRENT USE OF INSULIN (HCC): Primary | ICD-10-CM

## 2025-05-13 NOTE — TELEPHONE ENCOUNTER
Hypertension Medications Protocol Intaxl7005/12/2025 09:28 PM   Protocol Details Last BP reading less than 140/90       Patient requesting refill for: LISINOPRIL 20 MG Oral Tab     Last office visit 12/27/2024  Last refilled on 03/26/2025 for # 180 tablet with 0 refills  No future appointments.     Thank you.      Hypertension Medications Protocol Vqxqss9005/12/2025 09:28 PM   Protocol Details Last BP reading less than 140/90          Patient requesting refill for: HYDROCHLOROTHIAZIDE 25 MG Oral Tab     Last office visit 12/27/2024  Last refilled on 03/06/2024 for # 90 tablet with 0 refills  No future appointments.     Thank you.

## 2025-05-14 RX ORDER — HYDROCHLOROTHIAZIDE 25 MG/1
25 TABLET ORAL DAILY
Qty: 30 TABLET | Refills: 0 | Status: SHIPPED | OUTPATIENT
Start: 2025-05-14 | End: 2025-06-13

## 2025-05-14 RX ORDER — LISINOPRIL 20 MG/1
20 TABLET ORAL 2 TIMES DAILY
Qty: 60 TABLET | Refills: 0 | Status: SHIPPED | OUTPATIENT
Start: 2025-05-14 | End: 2025-06-13

## 2025-05-14 NOTE — TELEPHONE ENCOUNTER
Left message for patient to call back to make a follow up appointment. Also ask him to get blood work done prior at [facility].

## 2025-05-14 NOTE — TELEPHONE ENCOUNTER
1 month supply sent  Due for labs and hypertension/DM follow up  Labs ordered for [facility]. Please get done prior to appointment  Please schedule

## 2025-05-29 RX ORDER — INSULIN GLARGINE 100 [IU]/ML
INJECTION, SOLUTION SUBCUTANEOUS
Qty: 30 ML | Refills: 1 | Status: SHIPPED
Start: 2025-05-29 | End: 2025-06-13

## 2025-06-08 DIAGNOSIS — I10 PRIMARY HYPERTENSION: ICD-10-CM

## 2025-06-09 RX ORDER — LISINOPRIL 20 MG/1
20 TABLET ORAL 2 TIMES DAILY
Qty: 60 TABLET | Refills: 0 | Status: SHIPPED | OUTPATIENT
Start: 2025-06-09

## 2025-06-09 RX ORDER — HYDROCHLOROTHIAZIDE 25 MG/1
25 TABLET ORAL DAILY
Qty: 30 TABLET | Refills: 0 | Status: SHIPPED | OUTPATIENT
Start: 2025-06-09

## 2025-06-09 NOTE — TELEPHONE ENCOUNTER
Hypertension Medications Protocol Lubaid3306/08/2025 09:26 PM   Protocol Details Last BP reading less than 140/90    CMP or BMP in past 12 months    In person appointment or virtual visit in the past 12 mos or appointment in next 3 mos    EGFRCR or GFRNAA > 50    Medication is active on med list        BP Readings from Last 3 Encounters:   12/27/24 (!) 128/98   12/26/24 126/80   12/21/24 (!) 127/93     Last office visit 12/27/24  Last refilled on 5/14/25 for # 30 days  with 0 refills  Future Appointments   Date Time Provider Department Center   6/13/2025  8:45 AM Chely Ny APRN [facility]        Thank you.

## 2025-06-09 NOTE — TELEPHONE ENCOUNTER
Liz Mcwilliams MD to [facility] Clinical Staff (Selected Message)        6/9/25 12:41 PM  30 days provided, pt needs follow up. Thanks    Future Appointments   Date Time Provider Department Center   6/13/2025  8:45 AM Chely Ny APRN [facility]

## 2025-06-13 ENCOUNTER — TELEPHONE (OUTPATIENT)
Dept: FAMILY MEDICINE CLINIC | Facility: CLINIC | Age: 40
End: 2025-06-13

## 2025-06-13 ENCOUNTER — OFFICE VISIT (OUTPATIENT)
Facility: CLINIC | Age: 40
End: 2025-06-13
Payer: COMMERCIAL

## 2025-06-13 VITALS
WEIGHT: 315 LBS | OXYGEN SATURATION: 100 % | DIASTOLIC BLOOD PRESSURE: 76 MMHG | HEIGHT: 71 IN | BODY MASS INDEX: 44.1 KG/M2 | SYSTOLIC BLOOD PRESSURE: 128 MMHG | HEART RATE: 82 BPM

## 2025-06-13 DIAGNOSIS — E11.69 HYPERLIPIDEMIA ASSOCIATED WITH TYPE 2 DIABETES MELLITUS (HCC): ICD-10-CM

## 2025-06-13 DIAGNOSIS — Z13.89 SCREENING FOR NEPHROPATHY: ICD-10-CM

## 2025-06-13 DIAGNOSIS — E11.65 TYPE 2 DIABETES MELLITUS WITH HYPERGLYCEMIA, WITH LONG-TERM CURRENT USE OF INSULIN (HCC): Primary | ICD-10-CM

## 2025-06-13 DIAGNOSIS — Z79.4 TYPE 2 DIABETES MELLITUS WITH HYPERGLYCEMIA, WITH LONG-TERM CURRENT USE OF INSULIN (HCC): Primary | ICD-10-CM

## 2025-06-13 DIAGNOSIS — E78.5 HYPERLIPIDEMIA ASSOCIATED WITH TYPE 2 DIABETES MELLITUS (HCC): ICD-10-CM

## 2025-06-13 LAB — HEMOGLOBIN A1C: 6.5 % (ref 4.3–5.6)

## 2025-06-13 PROCEDURE — 83036 HEMOGLOBIN GLYCOSYLATED A1C: CPT | Performed by: NURSE PRACTITIONER

## 2025-06-13 PROCEDURE — 99214 OFFICE O/P EST MOD 30 MIN: CPT | Performed by: NURSE PRACTITIONER

## 2025-06-13 RX ORDER — SEMAGLUTIDE 0.68 MG/ML
0.5 INJECTION, SOLUTION SUBCUTANEOUS WEEKLY
Qty: 3 ML | Refills: 1 | Status: SHIPPED | OUTPATIENT
Start: 2025-06-13

## 2025-06-13 RX ORDER — DAPAGLIFLOZIN 5 MG/1
5 TABLET, FILM COATED ORAL DAILY
Qty: 90 TABLET | Refills: 0 | Status: SHIPPED | OUTPATIENT
Start: 2025-06-13 | End: 2025-09-11

## 2025-06-13 RX ORDER — INSULIN GLARGINE 100 [IU]/ML
INJECTION, SOLUTION SUBCUTANEOUS
Qty: 15 ML | Refills: 0 | Status: SHIPPED | OUTPATIENT
Start: 2025-06-13

## 2025-06-13 RX ORDER — SEMAGLUTIDE 0.68 MG/ML
INJECTION, SOLUTION SUBCUTANEOUS
Qty: 3 ML | Refills: 1 | Status: CANCELLED | OUTPATIENT
Start: 2025-06-13

## 2025-06-13 RX ORDER — METFORMIN HYDROCHLORIDE 500 MG/1
1000 TABLET, EXTENDED RELEASE ORAL 2 TIMES DAILY WITH MEALS
Qty: 360 TABLET | Refills: 1 | Status: SHIPPED | OUTPATIENT
Start: 2025-06-13

## 2025-06-13 NOTE — PATIENT INSTRUCTIONS
Return Visit:  APN: Return in about 3 months (around 9/13/2025) for diabetes follow up.  [] Video visit  [x] In person only      [x]  Directions to 1st floor lab          Summary of today's visit:    Last A1c value was 6.5% done 6/13/2025.    Medication changes:    Stop Trulicity 1.5 mg and start Ozempic 0.5 mg x 4 weeks  If you cannot get ozempic, you let me know right away or if you cannot tolerate the side effect you let me know as well  Message me in week 3 if you are tolerating this and will increase the dose to 1 mg on month 2  Start  Farxiga 5 mg every morning   Drink plenty of water and follow good hygiene after urination   If you cannot get farxiga, ask insurance if other SGLTi are covered under your plan: these brands are invokana, steglatro or jardiance   Decrease Lantus from 20 to 10 units every morning  Continue Metformin 1000 mg twice a day  - if you cannot get any of the new medicine today, we have to increase Lantus from 20 to 25 units   - start taking over the counter fish oil or omega 3  - .increase fiber intake : either from veggies or supplementary: benefiber or metamucil   - ideally for any procedure or surgery average glucose is below 180.   - maintain exercise at least 22 to 30 mins each day  - Schedule eye exam this year  Ophthalmologist for diabetes eye exam:  [facility] ( with various locations:[place], [place], [place], [place], [place]) :  -  [phone] or [phone]    [place]:   - [facility] ( ophthalmology): Phone: [phone], Address: [address] [place] [address] [address], [place] [address]  - [facility](accepts MEDICAID) ( ophthalmology)- [phone]  - [place] [facility] associate: Dr. Hunt or Dr. Retana  ( ophthalmology)- [phone]  -[facility]: Dr Mary Lucia- PHONE: [phone], [address] [place] [address] [address], [place] [address]    [place]:  - Dr. Oppenheim( ophthalmology)- [phone]  - [facility] (  optometrist)- [phone]- [facility], or [place]    [place]: [facility]( optometrist) - [phone]    [place]: [facility] ( optometrist)- [phone] they can do screenings using their machine    [place]: [facility] ( optometrist)- [phone]     [place]: Yamileth Will MD ( ophthalmology) -  [phone]       - If we started a new medication today that may not be covered by your insurance, our staff will reach out to you regarding any changes in the plan   - If on insulin, please ensure you have glucagon at home for emergencies     - Targeted glucose levels  - before breakfast or fasting glucose (at least 8-10 hrs of no food/sweetened beverage intake)    -  2 hrs after lunch or dinner < 180   -  before lunch or dinner <140  -  Follow 15g/15 min rule if you develop any hypoglycemia symptoms w/ glucose <70   -  but if glucose <55 follow 30g/15 min rule. Once glucose above 80, eat a protein or food w protein ie cheese, peanut butter, almond butter, cheese, yogurt.     Medication changes  Start Taking               semaglutide (OZEMPIC, 0.25 OR 0.5 MG/DOSE,) 2 MG/3ML Subcutaneous Solution Pen-injector Inject 0.5 mg into the skin once a week.    dapagliflozin (FARXIGA) 5 MG Oral Tab Take 1 tablet (5 mg total) by mouth daily.          These Medications Have Changed       Start Taking Instead of    insulin glargine (LANTUS SOLOSTAR) 100 UNIT/ML Subcutaneous Solution Pen-injector insulin glargine (LANTUS SOLOSTAR) 100 UNIT/ML Subcutaneous Solution Pen-injector    Dosage:  Inject 10 units daily subcutaneously, Actively titrating per endo instruction TDD max 15 units/day Dosage:  Inject 25 units daily subcutaneously, Actively titrating per endo instruction TDD max 32 units/day          Stop Taking                Dulaglutide (TRULICITY) 1.5 MG/0.5ML Subcutaneous Solution Auto-injector    Inject 1.5 mg into the skin once a week.                 Additional comments:   - If labs were ordered today, please complete prior   next follow up, fasting blood test with urine test   - Please let us know if you require any refills at least 1 week prior to your medication running out   - Please call our office if sugars at home are consistently greater than 250 or less than 70   - The on-call pager is for emergencies only. If you are a type 1 diabetic and run out of insulin after business hours 8AM-4PM, you may call the on-call pager for a refill to a 24 hour pharmacy. All other refill requests and forms that can our clinic can help to fill out should be requested during business hours.       HOW TO TREAT LOW BLOOD SUGAR (Hypoglycemia)  Low blood sugar= Less than 70, or if you start to have symptoms (below)  Symptoms: Shaking or trembling, fast heart rate, extreme hunger, sweating, confusion/difficulty concentrating, dizziness.    How to treat a low blood sugar if you are able to eat/drink: The Rule of 15/15  If you are using continuous glucose monitor that says you are low, but you do not have any symptoms, verify on fingerstick that your blood sugar is actually low before treating.   Eat 15 grams of carbs (see examples below)  Check your blood sugar after 15 minutes. If it’s still below your target range, have another serving.   Repeat these steps until it’s in your target range. Once it’s in range, if you're nervous about your sugar going low again, have a protein source (ie, a spoonful of peanut butter).   If you have a CGM you want to look for how your arrow has changed. If you arrow is pointed up or sideways after 15 min, give your CGM more time OR check with a finger stick. Try not to eat more food until at least 15 min after the first BG check - otherwise you risk having a rebound high.  If you are experiencing symptoms and you are unable to check your blood glucose for any reason, treat the hypoglycemia.  If someone has a low blood sugar and is  unconscious: Don’t hesitate to call 911. If someone is unconscious and glucagon is not available or someone does not know how to use it, call 911 immediately.     To treat a low glucose <70, I recommend you carry with you easy, pre-portioned treatment for low blood sugars that are 15G of carbs:   - Children sized squeeze pouch applesauce (low fiber)  - Small children's sized juicebox- 15g carb --> 4oz juice box  - Glucose tablets from [facility], you can find them near diabetes supplies --> Note, you will need to eat 3-4 tablets to get to 15g of carbs  - Children sized fruit snack pack- look for one with 15 grams of total carbohydrate  - Choice of how to treat your low is important. Complex carbs, or foods that contain fats along with carbs, (like chocolate) can slow the absorption of glucose and should NOT be used to treat an emergency low   - carbs that has protein (such as glucerna) will not increase your glucose immediately. Avoid protein when glucose is below 70.

## 2025-06-13 NOTE — TELEPHONE ENCOUNTER
Labs due  Mychart message sent  Future Appointments   Date Time Provider Department Center   6/13/2025  8:45 AM Chely Ny APRN [facility]

## 2025-06-13 NOTE — PROGRESS NOTES
[facility] Note    Name: Jak Esquivel    Date: 06/13/25     Jak Esquivel is a 40 year old male who presents for evaluation of T2DM management.     Chief complaint: Follow - Up (PT here for routine T2DM f/u, per pt no current concerns or sx.  Patient states that he didn't take his Trulicity this week due to high copayment.  C/o feeling hungry and gaining weight./Per pt checks BG via fingerstick 2 times a day, fasting 146 today./Last A1c value was 9.4% done 12/20/2024.)       Subjective:   DM hx:  -Diagnosed with diabetes in age 37, had pre-diabetes for many years prior  -Started insulin: started insulin this past Dec, 2024   -Family history- yes, mother    Initial HPI consult - 12/26/2024  Here to establish care. Discharged last 12/21/24 in the hospital due to gastroenteritis and hyponatremia  (symptoms of  NVD)   He also started Mounjaro about 3 weeks ago  ( Dec 2024)  DM meds at first office visit: metformin 1000mg twice daily,  lantus  25 u every day, but was taking 2 units in the last 2 days, insurance did not dispense it right away  Blood Glucose log reviewed. Checking 1 times per day. Patient did not bring his/her glucometer today, pt. reported range  150-160 before breakfast , episodes of hypoglycemia: No; States meter broke yesterday     -Re: potential DM medication contraindication (if positive, checkbox selected):  [] History of pancreatitis-denies   [] Personal/fam hx of medullary thyroid cancer/MEN2-denies   [] History of recent/frequent UTI/yeast infxn-denies   [] Previous amputation related to diabetes-denies   [] Hx of ODHM9d-djoauxq euglycemic DKA-denies    -Presence of associated DM complications (if positive, checkbox selected):  [] Macrovascular complications (CAD/CVA/PAD)-denies , + hx left ventricular hypertrophy   [] Neuropathy-denies   [] Retinopathy-denies   [] Nephropathy-denies   [x] HTN  [x] Hyperlipidemia  [] Stroke/TIA- denies   [] Gastroparesis-denies   [] Wound,  ulcer or amputations-denies   - Lifestyle:   - Modifying factors: states was not checking his glucose level prior hospitalisation, compliant with medicine   - Steroid use: No    Previously trialed/failed DM meds: mounjaro ( GI symptoms) , trulicity( high co pay)    INTERIM HX with MERARI Mo 06/13/25 :Here for follow up; did not get the sensor lately.   Current treatment: Lantus 20 unit every evening, Trulicity 1.5 mg weekly( last taken June 6, due to high cost), Metformin 500 mg 2 tabs twice daily,  Testing: check glucose 1-2x a day, Patient did not bring his glucometer today, pt. reported range  fasting 92,120 and before dinner: 140-150; Hypoglycemia: denies   New vision problem :denies , last eye visit last year around this time, no DR   Numbness and tingling to extremities: denies ; Wound: denies   Diet/Activities: eating veggies every other day , walking, playing volley ball with kids( 3 kids)  New complication related to DM:  denies ; Upcoming procedure or surgery: denies     History/Other:    Allergies, PMH, SocHx and FHx reviewed and updated as appropriate in Epic on    semaglutide (OZEMPIC, 0.25 OR 0.5 MG/DOSE,) 2 MG/3ML Subcutaneous Solution Pen-injector Inject 0.5 mg into the skin once a week. 3 mL 1    dapagliflozin (FARXIGA) 5 MG Oral Tab Take 1 tablet (5 mg total) by mouth daily. 90 tablet 0    insulin glargine (LANTUS SOLOSTAR) 100 UNIT/ML Subcutaneous Solution Pen-injector Inject 10 units daily subcutaneously, Actively titrating per endo instruction TDD max 15 units/day 15 mL 0    metFORMIN  MG Oral Tablet 24 Hr Take 2 tablets (1,000 mg total) by mouth 2 (two) times daily with meals. 360 tablet 1    lisinopril 20 MG Oral Tab TAKE 1 TABLET BY MOUTH TWICE  DAILY 60 tablet 0    hydroCHLOROthiazide 25 MG Oral Tab TAKE 1 TABLET BY MOUTH DAILY 30 tablet 0    Insulin Pen Needle (BD PEN NEEDLE ARIELA U/F) 32G X 4 MM Does not apply Misc Inject 1 times per day. Use a new pen needle with each injection 100  each 1    Glucose Blood (ACCU-CHEK GUIDE TEST) In Vitro Strip USE AS DIRECTED. PATIENT TESTS 3 TIMES A  strip 1    AMLODIPINE 2.5 MG Oral Tab TAKE 1 TABLET (2.5 MG TOTAL) BY MOUTH IN THE MORNING AND AT BEDTIME 60 tablet 2    Blood Glucose Monitoring Suppl Does not apply Kit Use to test BG. 1 kit 0    Lidocaine HCl-Hydrocortisone 3-0.5 % External Cream Apply 1 Application topically in the morning and 1 Application before bedtime. 57 g 0    Accu-Chek FastClix Lancets Does not apply Misc Test three times daily. 300 each 0    ACCU-CHEK GUIDE In Vitro Strip USE ONE NEW TEST STRIP EACH TIME TO TEST BLOOD SUGARS IN THE MORNING, AT NOON AND AT BEDTIME 100 strip 2    Blood Glucose Monitoring Suppl (ACCU-CHEK GUIDE ME) w/Device Does not apply Kit 1 each daily. 1 kit 0    omeprazole 20 MG Oral Capsule Delayed Release Take 1 capsule (20 mg total) by mouth every morning before breakfast.       Allergies[1]  Current Outpatient Medications   Medication Sig Dispense Refill    semaglutide (OZEMPIC, 0.25 OR 0.5 MG/DOSE,) 2 MG/3ML Subcutaneous Solution Pen-injector Inject 0.5 mg into the skin once a week. 3 mL 1    dapagliflozin (FARXIGA) 5 MG Oral Tab Take 1 tablet (5 mg total) by mouth daily. 90 tablet 0    insulin glargine (LANTUS SOLOSTAR) 100 UNIT/ML Subcutaneous Solution Pen-injector Inject 10 units daily subcutaneously, Actively titrating per endo instruction TDD max 15 units/day 15 mL 0    metFORMIN  MG Oral Tablet 24 Hr Take 2 tablets (1,000 mg total) by mouth 2 (two) times daily with meals. 360 tablet 1    lisinopril 20 MG Oral Tab TAKE 1 TABLET BY MOUTH TWICE  DAILY 60 tablet 0    hydroCHLOROthiazide 25 MG Oral Tab TAKE 1 TABLET BY MOUTH DAILY 30 tablet 0    Insulin Pen Needle (BD PEN NEEDLE ARIELA U/F) 32G X 4 MM Does not apply Misc Inject 1 times per day. Use a new pen needle with each injection 100 each 1    Glucose Blood (ACCU-CHEK GUIDE TEST) In Vitro Strip USE AS DIRECTED. PATIENT TESTS 3 TIMES A   strip 1    AMLODIPINE 2.5 MG Oral Tab TAKE 1 TABLET (2.5 MG TOTAL) BY MOUTH IN THE MORNING AND AT BEDTIME 60 tablet 2    Blood Glucose Monitoring Suppl Does not apply Kit Use to test BG. 1 kit 0    Lidocaine HCl-Hydrocortisone 3-0.5 % External Cream Apply 1 Application topically in the morning and 1 Application before bedtime. 57 g 0    Accu-Chek FastClix Lancets Does not apply Misc Test three times daily. 300 each 0    ACCU-CHEK GUIDE In Vitro Strip USE ONE NEW TEST STRIP EACH TIME TO TEST BLOOD SUGARS IN THE MORNING, AT NOON AND AT BEDTIME 100 strip 2    Blood Glucose Monitoring Suppl (ACCU-CHEK GUIDE ME) w/Device Does not apply Kit 1 each daily. 1 kit 0    omeprazole 20 MG Oral Capsule Delayed Release Take 1 capsule (20 mg total) by mouth every morning before breakfast.       Past Medical History:    Diabetes (HCC)    High blood pressure    HTN (hypertension)    LVH (left ventricular hypertrophy)    Sleep apnea    no device yet at time of screen 1/24    Visual impairment    glasses     Family History   Problem Relation Age of Onset    Breast Cancer Mother     Heart Attack Mother     Heart Disease Mother     Diabetes Father     Colon Cancer Sister     Thyroid disease Sister     Diabetes Maternal Grandmother     Diabetes Maternal Grandfather     Diabetes Paternal Grandmother     Diabetes Paternal Grandfather      Social history: Reviewed.    ROS/Exam    REVIEW OF SYSTEMS: Ten point review of systems has been performed and is otherwise negative and/or non-contributory, except as described above.     VITALS  Vitals:    06/13/25 0857   BP: 128/76   Pulse: 82   SpO2: 100%   Weight: (!) 323 lb (146.5 kg)   Height: 5' 11\" (1.803 m)       Wt Readings from Last 6 Encounters:   06/13/25 (!) 323 lb (146.5 kg)   12/27/24 298 lb (135.2 kg)   12/26/24 (!) 301 lb 6.4 oz (136.7 kg)   12/19/24 284 lb (128.8 kg)   11/06/24 (!) 308 lb (139.7 kg)   08/05/24 (!) 339 lb (153.8 kg)     PHYSICAL EXAM  CONSTITUTIONAL:  awake, alert,  cooperative, no apparent distress, and appears stated age Vss stable, obese  PSYCH: normal affect  LUNGS: breathing comfortably  CARDIOVASCULAR:  regular rate   NECK:  no palpable thyroid nodules     Labs/Imaging:   Lab Results   Component Value Date    CHOLEST 165 01/02/2025    CHOLEST 219 (H) 07/06/2024    TRIG 145 01/02/2025    TRIG 302 (H) 07/06/2024    HDL 36 (L) 01/02/2025    HDL 36 (L) 07/06/2024     (H) 01/02/2025     (H) 07/06/2024     No results found for: \"MICROALBCREA\"   Lab Results   Component Value Date    CREATSERUM 0.72 01/02/2025    CREATSERUM 1.33 (H) 12/21/2024    EGFRCR 120 01/02/2025    EGFRCR 70 12/21/2024     Lab Results   Component Value Date    AST 13 12/19/2024    AST 60 (H) 07/06/2024    ALT 26 12/19/2024     (H) 07/06/2024     Overall glucose control:  Lab Results   Component Value Date    A1C 6.5 (A) 06/13/2025    A1C 9.4 (H) 12/20/2024    A1C 10.8 (A) 11/06/2024    A1C 12.5 (H) 07/06/2024    A1C 7.0 (H) 03/10/2023       Assessment & Plan:     ICD-10-CM    1. Type 2 diabetes mellitus with hyperglycemia, with long-term current use of insulin (HCC)  E11.65 POC Hemoglobin A1C    Z79.4 semaglutide (OZEMPIC, 0.25 OR 0.5 MG/DOSE,) 2 MG/3ML Subcutaneous Solution Pen-injector     dapagliflozin (FARXIGA) 5 MG Oral Tab     metFORMIN  MG Oral Tablet 24 Hr      2. Screening for nephropathy  Z13.89       3. Hyperlipidemia associated with type 2 diabetes mellitus (HCC)  E11.69     E78.5         Jak Esquivel is a pleasant 40 year old male here for evaluation of:    #Diabetes  PMHx of Type 2 diabetes mellitus diagnosed in in age 37, had pre-diabetes for many years prior.    AutoAB for type 1 DM: IA2 (-), Islet cell( not done), ALBA 65(-), ZnT8 (-); C peptide:detected  - Last A1c value was 6.5% done 6/13/2025.   - discussed Goal <7%. Importance of better glucose control in preventing onset/progression of end-organ damage discussed, as well as goals of therapy and clinical  significance of A1C.  Plan: A1c at goal, patient last taking his Trulicity last week, did not take it today due to high co-pay.  He mentioned that he did not had weight loss with Trulicity 1.5.  Discussed to him we can try Ozempic, and he agreed with this plan.  E prescription Ozempic and discussed its benefits, side effect and how to counteract side effect effect.  Also recommended to start Farxiga, discussed its benefits and side effects and how to counteract side effect.  We will decrease insulin as we add these medicine.  Discussed to him that if he cannot avail any of this to medicine to let me know right away so we can adjust the insulin to control his sugar.  He denies any upcoming procedure or surgery, discussed how to hold GLP and SGLT2 inhibitor prior any procedure or surgery.  Previously was discharged last 12/21/24 in the hospital due to gastroenteritis and hyponatremia  (symptoms of  NVD) , was initially on Mounjaro and after discharged it was not resumed. Previously we discussed to patient that if potentially mounjaro is too strong for him and we will try to use other glp1 a.   BMI 45- did not loose weight with trulicity, switching to ozempic, discussed Balanced diet: carbohydrates, protein, healthy fats and fiber in each meal. Exercise of at least 150 mins each week. Decrease your simple carbohydrates intake such as sweets: cookies, soda, candy, white rice, white pasta, syrups  - start CGM--patient to call insurance and inquire which one is covered, then to notify the clinic with phone (connected to clinic profile)  - patient is on insulin, and has suboptimal glycemic control including wide glycemic swings, thus would benefit from CGM  - continue to check BG 2x/day using glucometer or CGM and ordered tfts  - Discussed management of low glucose and targeted glucose levels and provided instructions in AVS   - med changes:  Stop Trulicity 1.5 mg and start Ozempic 0.5 mg x 4 weeks  If you cannot get  ozempic, you let me know right away or if you cannot tolerate the side effect you let me know as well  Message me in week 3 if you are tolerating this and will increase the dose to 1 mg on month 2  Start  Farxiga 5 mg every morning   Drink plenty of water and follow good hygiene after urination   If you cannot get farxiga, ask insurance if other SGLTi are covered under your plan: these brands are invokana, steglatro or jardiance   Decrease Lantus from 20 to 10 units every morning  Continue Metformin 1000 mg twice a day  - if you cannot get any of the new medicine today, we have to increase Lantus from 20 to 25 units   - .increase fiber intake : either from veggies or supplementary: benefiber or metamucil   - ideally for any procedure or surgery average glucose is below 180.   - maintain exercise at least 22 to 30 mins each day  Diabetic Medications              semaglutide (OZEMPIC, 0.25 OR 0.5 MG/DOSE,) 2 MG/3ML Subcutaneous Solution Pen-injector (Taking) Inject 0.5 mg into the skin once a week.    dapagliflozin (FARXIGA) 5 MG Oral Tab (Taking) Take 1 tablet (5 mg total) by mouth daily.    insulin glargine (LANTUS SOLOSTAR) 100 UNIT/ML Subcutaneous Solution Pen-injector (Taking) Inject 10 units daily subcutaneously, Actively titrating per endo instruction TDD max 15 units/day    metFORMIN  MG Oral Tablet 24 Hr (Taking) Take 2 tablets (1,000 mg total) by mouth 2 (two) times daily with meals.          Supplementary Documentation:   -Surveillance for Diabetes Complications & Risks  Foot exam/neuropathy: Last Foot Exam: 11/06/24    Retinopathy screening: No data recordedNo data recorded  6 mos ago per patient, discussed to see opthalmology    # Nephropathy screening  Lab Results   Component Value Date    EGFRCR 120 01/02/2025    MALBCREACALC 22 07/06/2024     Lab Results   Component Value Date    CREAUR 78 07/06/2024    MICROALBUMIN 1.7 07/06/2024    MALBCREACALC 22 07/06/2024   Plan: urine MCR already ordered by  the PCP, will follow    #HTN- Blood pressure control: - SBP is to goal <130   BP Readings from Last 1 Encounters:   06/13/25 128/76   BP Meds: amLODIPine Tabs - 2.5 MG; hydroCHLOROthiazide Tabs - 25 MG; lisinopril Tabs - 20 MG   Plan: followed by PCP    #Hyperlipidemia  Lipids: LDL is not to goal   Lab Results   Component Value Date     (H) 01/02/2025    TRIG 145 01/02/2025   Cholesterol Meds:    -due to his age, defer statin for now   Plan: Discussed start taking over the counter fish oil or omega 3    Return in about 3 months (around 9/13/2025) for diabetes follow up.      Thank you for allowing me to participate in the care of this patient.  Please feel free to contact me with any questions.    MERARI Driscoll, [facility]  Endocrinology, Diabetes & Metabolism   06/13/25    In reviewing this note, please be advised that Dragon Voice Recognition software used to dictate the note may have made errors in recognizing some of the words or phrases.     Note to patient: The 21 Century Cures Act makes medical notes like these available to patients in the interest of transparency. However, be advised this is a medical document. It is intended as peer to peer communication. It is written in medical language and may contain abbreviations or verbiage that are unfamiliar. It may appear blunt or direct. Medical documents are intended to carry relevant information, facts as evident, and the clinical opinion of the practitioner.           [1]   Allergies  Allergen Reactions    Mounjaro [Tirzepatide] DIARRHEA

## 2025-06-16 ENCOUNTER — TELEPHONE (OUTPATIENT)
Facility: CLINIC | Age: 40
End: 2025-06-16

## 2025-06-16 NOTE — TELEPHONE ENCOUNTER
Initiated PA for FARXIGA 5 MG Oral Tab through EPIC.  Submitted LOV notes and last HgA1c results along with PA questions.  Will await PA determination through EPIC.

## 2025-06-16 NOTE — TELEPHONE ENCOUNTER
Initiated PA for OZEMPIC, 0.25 OR 0.5 MG/DOSE, 2 MG/3ML Subcutaneous Solution Pen-injector through EPIC.  Submitted LOV notes along with last HgA1c results attached to PA questions.    Will await PA determination through EPIC.

## 2025-06-16 NOTE — TELEPHONE ENCOUNTER
Prior authorization approved  Payer: [employer] Part D Case ID: [id number]    [phone]    [phone]  Note from payer: Request Reference Number: [id number].  DAPAGLIFLOZI TAB 5MG is approved through 06/16/2026.  Your patient may now fill this prescription and it will be covered.  Approval Details    Authorization number: [id number]  Authorized from June 16, 2025 to June 16, 2026    Closing Encounter

## 2025-07-12 DIAGNOSIS — E11.65 TYPE 2 DIABETES MELLITUS WITH HYPERGLYCEMIA (HCC): ICD-10-CM

## 2025-07-14 ENCOUNTER — TELEPHONE (OUTPATIENT)
Dept: FAMILY MEDICINE CLINIC | Facility: CLINIC | Age: 40
End: 2025-07-14

## 2025-07-14 NOTE — TELEPHONE ENCOUNTER
Labs due  Mychart message sent  Future Appointments   Date Time Provider Department Center   9/12/2025  9:30 AM Chely Ny APRN [facility]

## 2025-07-15 RX ORDER — INSULIN GLARGINE 100 [IU]/ML
INJECTION, SOLUTION SUBCUTANEOUS
Qty: 12 ML | Refills: 1 | Status: SHIPPED | OUTPATIENT
Start: 2025-07-15

## 2025-07-15 NOTE — TELEPHONE ENCOUNTER
Endocrine refill protocol for basal insulins     Protocol Criteria: PASSED Reason: N/A    If all below requirements are met, send a 90-day supply with 1 refill per provider protocol.       Verify Appointment with Endocrinology completed in the last 6 months or scheduled in the next 3 months.  Verify A1C has been completed within the last 6 months and is below 8.5%     Last completed office visit:6/13/2025 Chely Ny APRN   Last completed telemed visit: Visit date not found  Next scheduled Follow up:   Future Appointments   Date Time Provider Department Center   9/12/2025  9:30 AM Chely Ny APRN [facility] [place]      Last A1c result: Last A1c value was 6.5% done 6/13/2025.

## 2025-08-14 ENCOUNTER — TELEPHONE (OUTPATIENT)
Dept: FAMILY MEDICINE CLINIC | Facility: CLINIC | Age: 40
End: 2025-08-14

## (undated) DEVICE — CANISTER SUCT 1200CC LID BLU HRD ADPT AUTO

## (undated) DEVICE — TRAP POLYP W/ 2 SPEC TY CLR MAGNIFYING WIND

## (undated) DEVICE — KIT VLV 5 PC AIR H2O SUCT BX ENDOGATOR CONN

## (undated) DEVICE — KIT CUSTOM ENDOPROCEDURE STERIS

## (undated) DEVICE — CANNULA NSL AD W/ FLTR 14FT O2/CO2

## (undated) DEVICE — SNARE HEX ROT 10MM  230CM CLD ONLY

## (undated) DEVICE — ELECTRODE EKG W3.5XL4CM ABRAD W1.25XL1.5IN

## (undated) NOTE — LETTER
08/09/24      Jak Esquivel   [address] [place] [address]           Dear Jak Esquivel     Our records indicate that you have outstanding lab work and/or testing that was ordered for you and has not yet been completed:  Lab Frequency Next Occurrence   US ABDOMEN COMPLETE (CPT=76700) Once 08/05/2024    Fasting Labs  To provide you with the best possible care, please complete these orders at your earliest convenience. If you have recently completed these orders please disregard this letter.   To schedule imaging, or outpatient tests please call Central Scheduling at [phone].  For any blood work needed, you can get this done at the Reference Lab in our [place] office anytime Monday-Friday from 7:30am-4:00pm and you do not need an appointment. They are closed for lunch between 12-1pm. They are closed Saturday and Sunday. If you need a time outside of these hours please call us to schedule an appointment.   *If you prefer to use [facility] for your labs please let us know so we can fax your orders.     Thank you,    [facility]

## (undated) NOTE — LETTER
10/09/24    Jak Esquivel   [address] [place] [address]           Dear Jak Esquivel     Our records indicate that you have outstanding lab work and/or testing that was ordered for you and has not yet been completed:   A1C  CMP  To provide you with the best possible care, please complete these orders at your earliest convenience. If you have recently completed these orders please disregard this letter.   To schedule imaging, or outpatient tests please call Central Scheduling at [phone].  For any blood work needed, you can get this done at the Reference Lab in our [place] office anytime Monday-Friday from 7:30am-4:00pm and you do not need an appointment. They are closed for lunch between 12-1pm. They are closed Saturday and Sunday. If you need a time outside of these hours please call us to schedule an appointment.   *If you prefer to use [facility] for your labs please let us know so we can fax your orders.     Thank you,    [facility] [place]

## (undated) NOTE — Clinical Note
Transitional Care Management call completed. A telephone encounter was sent to the office for an appointment request. The hospitalist team was contacted to confirm medications held at hospital discharge. Thank you.

## (undated) NOTE — LETTER
01/19/24    Jak Esquivel   [address] [place] [address]           Dear Jak Esquivel     Our records indicate that you have outstanding lab work and/or testing that was ordered for you and has not yet been completed:  Lab Frequency Next Occurrence   CBC  CMP  Lipid  A1C  Urine Microalbumin  To provide you with the best possible care, please complete these orders at your earliest convenience. If you have recently completed these orders please disregard this letter.   To schedule imaging, or outpatient tests please call Central Scheduling at [phone].  For any blood work needed, you can get this done at the Reference Lab in our [place] office anytime Monday-Friday from 8am-4:15pm. They are closed Saturday and Sunday. If you need a time outside of these hours please call us to schedule an appointment.   *If you prefer to use [facility] for your labs please let us know so we can fax your orders.     Thank you,    [facility]    [facility] Locations    Online [facility] Scheduling  [email or web address]    [facility] [address], [facility], [address] [place] [address]   Phone - [phone], Fax - [phone]   Office hours are:   Monday - Friday- 7:30-11:30, 12:30-3pm   Saturday - 6:30-11:30   Sunday - closed     [address] [place], [place]   Phone: [phone]  Fax: [phone] [address] [place], [place]  Inside [facility] [address]  Phone: [phone]  Fax: [phone]    [address], [place]  Phone: [phone]  Fax: [phone]    [address] [place], [place]  Phone: [phone]  Fax: [phone] [address] [facility] [address] [facility] [address], [place]  Phone: [phone]  Fax: [phone] [address], [place], [place] [address]  Phone [phone]  Fax: [phone]

## (undated) NOTE — LETTER
23    Michelle Chan   [address] [place] [address]           Dear Andreas Hunter records indicate that you have outstanding lab work and/or testing that was ordered for you and has not yet been completed:   fasting labs  To provide you with the best possible care, please complete these orders at your earliest convenience. If you have recently completed these orders please disregard this letter. To schedule please call Central Scheduling at [phone]. If you have any questions please call the office at [phone]. *If you prefer to use [facility] for your labs please let us know so we can fax your orders. Thank you,    [address] Locations    Online [facility] Scheduling  [email or web address]    [address] [place] [address], [address] [place], [place]   Phone - [phone], Fax - [phone]   Office hours are:   Monday - Friday- 7:30-11:30, 12:30-3pm   Saturday - 6:30-11:30    - closed      [place], [place]   Phone: [phone]  Fax: [phone], [facility] [address] [facility], [address]  Phone: [phone]  Fax: [phone]    [address]  Phone: [phone]  Fax: [phone]    [address] [place] [address], [place], [place]  Phone: [phone]  Fax: [phone].  [address] [facility] [place], [place]  Phone: [phone]  Fax: [phone]    [address] [facility] [address], [address], [address]  Phone [phone]  Fax: [phone]